# Patient Record
Sex: FEMALE | Race: BLACK OR AFRICAN AMERICAN
[De-identification: names, ages, dates, MRNs, and addresses within clinical notes are randomized per-mention and may not be internally consistent; named-entity substitution may affect disease eponyms.]

---

## 2018-10-31 ENCOUNTER — HOSPITAL ENCOUNTER (INPATIENT)
Dept: HOSPITAL 74 - JER | Age: 68
LOS: 7 days | Discharge: NURSING, REHAB OR LONG-TERM CARE | DRG: 871 | End: 2018-11-07
Attending: INTERNAL MEDICINE | Admitting: INTERNAL MEDICINE
Payer: COMMERCIAL

## 2018-10-31 VITALS — BODY MASS INDEX: 53.5 KG/M2

## 2018-10-31 DIAGNOSIS — A40.1: Primary | ICD-10-CM

## 2018-10-31 DIAGNOSIS — R07.89: ICD-10-CM

## 2018-10-31 DIAGNOSIS — L03.116: ICD-10-CM

## 2018-10-31 DIAGNOSIS — G89.4: ICD-10-CM

## 2018-10-31 DIAGNOSIS — R60.0: ICD-10-CM

## 2018-10-31 DIAGNOSIS — Z89.422: ICD-10-CM

## 2018-10-31 DIAGNOSIS — B96.89: ICD-10-CM

## 2018-10-31 DIAGNOSIS — I83.018: ICD-10-CM

## 2018-10-31 DIAGNOSIS — N39.0: ICD-10-CM

## 2018-10-31 DIAGNOSIS — L97.828: ICD-10-CM

## 2018-10-31 DIAGNOSIS — I21.4: ICD-10-CM

## 2018-10-31 DIAGNOSIS — E11.51: ICD-10-CM

## 2018-10-31 DIAGNOSIS — I12.9: ICD-10-CM

## 2018-10-31 DIAGNOSIS — L97.428: ICD-10-CM

## 2018-10-31 DIAGNOSIS — E78.5: ICD-10-CM

## 2018-10-31 DIAGNOSIS — L03.115: ICD-10-CM

## 2018-10-31 DIAGNOSIS — E11.22: ICD-10-CM

## 2018-10-31 DIAGNOSIS — Z79.4: ICD-10-CM

## 2018-10-31 DIAGNOSIS — L97.818: ICD-10-CM

## 2018-10-31 DIAGNOSIS — E11.622: ICD-10-CM

## 2018-10-31 DIAGNOSIS — J44.9: ICD-10-CM

## 2018-10-31 DIAGNOSIS — I83.028: ICD-10-CM

## 2018-10-31 DIAGNOSIS — N18.3: ICD-10-CM

## 2018-10-31 DIAGNOSIS — D64.9: ICD-10-CM

## 2018-10-31 DIAGNOSIS — E66.01: ICD-10-CM

## 2018-10-31 LAB
ALBUMIN SERPL-MCNC: 3.1 G/DL (ref 3.4–5)
ALP SERPL-CCNC: 56 U/L (ref 45–117)
ALT SERPL-CCNC: 15 U/L (ref 13–61)
ANION GAP SERPL CALC-SCNC: 9 MMOL/L (ref 8–16)
ANISOCYTOSIS BLD QL: 0
APPEARANCE UR: (no result)
APTT BLD: 32.6 SECONDS (ref 25.2–36.5)
AST SERPL-CCNC: 10 U/L (ref 15–37)
BACTERIA #/AREA URNS HPF: (no result) /HPF
BASOPHILS # BLD: 1 % (ref 0–2)
BILIRUB SERPL-MCNC: 0.3 MG/DL (ref 0.2–1)
BILIRUB UR STRIP.AUTO-MCNC: NEGATIVE MG/DL
BUN SERPL-MCNC: 30 MG/DL (ref 7–18)
CALCIUM SERPL-MCNC: 8.8 MG/DL (ref 8.5–10.1)
CHLORIDE SERPL-SCNC: 106 MMOL/L (ref 98–107)
CO2 SERPL-SCNC: 24 MMOL/L (ref 21–32)
COLOR UR: (no result)
CREAT SERPL-MCNC: 1.1 MG/DL (ref 0.55–1.3)
DEPRECATED RDW RBC AUTO: 16.4 % (ref 11.6–15.6)
EOSINOPHIL # BLD: 0.1 % (ref 0–4.5)
GLUCOSE SERPL-MCNC: 143 MG/DL (ref 74–106)
HCT VFR BLD CALC: 26.8 % (ref 32.4–45.2)
HGB BLD-MCNC: 8.3 GM/DL (ref 10.7–15.3)
INR BLD: 1.14 (ref 0.83–1.09)
KETONES UR QL STRIP: NEGATIVE
LEUKOCYTE ESTERASE UR QL STRIP.AUTO: (no result)
LYMPHOCYTES # BLD: 1.5 % (ref 8–40)
MACROCYTES BLD QL: 0
MCH RBC QN AUTO: 24.7 PG (ref 25.7–33.7)
MCHC RBC AUTO-ENTMCNC: 30.9 G/DL (ref 32–36)
MCV RBC: 79.8 FL (ref 80–96)
MONOCYTES # BLD AUTO: 3.2 % (ref 3.8–10.2)
MUCOUS THREADS URNS QL MICRO: (no result)
NEUTROPHILS # BLD: 94.2 % (ref 42.8–82.8)
NITRITE UR QL STRIP: NEGATIVE
PH BLDV: 7.36 [PH] (ref 7.32–7.42)
PH UR: 5 [PH] (ref 5–8)
PLATELET # BLD AUTO: 379 K/MM3 (ref 134–434)
PLATELET BLD QL SMEAR: NORMAL
PMV BLD: 7.2 FL (ref 7.5–11.1)
POTASSIUM SERPLBLD-SCNC: 4.7 MMOL/L (ref 3.5–5.1)
PROT SERPL-MCNC: 8.5 G/DL (ref 6.4–8.2)
PROT UR QL STRIP: (no result)
PROT UR QL STRIP: NEGATIVE
PT PNL PPP: 13.5 SEC (ref 9.7–13)
RBC # BLD AUTO: 3.36 M/MM3 (ref 3.6–5.2)
SODIUM SERPL-SCNC: 139 MMOL/L (ref 136–145)
SP GR UR: 1.01 (ref 1.01–1.03)
UROBILINOGEN UR STRIP-MCNC: NEGATIVE MG/DL (ref 0.2–1)
VENOUS PC02: 42.9 MMHG (ref 38–52)
VENOUS PO2: 23.3 MMHG (ref 28–48)
WBC # BLD AUTO: 19.6 K/MM3 (ref 4–10)

## 2018-10-31 PROCEDURE — P9058 RBC, L/R, CMV-NEG, IRRAD: HCPCS

## 2018-10-31 PROCEDURE — C1751 CATH, INF, PER/CENT/MIDLINE: HCPCS

## 2018-10-31 PROCEDURE — G0008 ADMIN INFLUENZA VIRUS VAC: HCPCS

## 2018-10-31 PROCEDURE — P9038 RBC IRRADIATED: HCPCS

## 2018-10-31 PROCEDURE — G0277 HBOT, FULL BODY CHAMBER, 30M: HCPCS

## 2018-10-31 NOTE — PDOC
History of Present Illness





- General


History Source: Patient


Exam Limitations: No Limitations





- History of Present Illness


Initial Comments: 





10/31/18 14:35


The patient is a 68 year old female, with a significant PMH of diabetes, asthma

, hypertension who presents to the emergency department sent in for fever. 

Patient states she was going to her hyperbaric treatment but was unable to have 

treatment secondary to her fever. Patient had her dressing changed yesterday. 

Patient admits to warmth to her left leg. 





The patient denies chest pain, shortness of breath, headache and dizziness.


Denies chills, nausea, vomit, diarrhea and constipation.


Denies dysuria, frequency, urgency and hematuria.





Allergies: NKA


Past surgical history: tubal ligation


Social history: No reported alcohol, drug, or cigarette use. 


PCP: Dr. Aleman 


 


 





<Ana Harrington - Last Filed: 10/31/18 15:06>





- General


History Source: Patient


Exam Limitations: No Limitations





<Elizabeth Lozada - Last Filed: 11/01/18 10:43>





- General


Chief Complaint: Respiratory


Stated Complaint: FEVER


Time Seen by Provider: 10/31/18 13:52





Past History





<Ana Harrington - Last Filed: 10/31/18 15:06>





- Past Medical History


Asthma: Yes


Cancer: No


Cardiac Disorders: No


CVA: No


COPD: No


CHF: No


Dementia: No


Diabetes: Yes


GI Disorders: No


 Disorders: No


HTN: No


Hypercholesterolemia: No


Liver Disease: No


Seizures: No


Thyroid Disease: No





- Surgical History


Abdominal Surgery: No


Appendectomy: No


Cardiac Surgery: No


Cholecystectomy: No


Lung Surgery: No


Neurologic Surgery: No


Orthopedic Surgery: No





- Suicide/Smoking/Psychosocial Hx


Smoking History: Never smoked


Have you smoked in the past 12 months: No





<Elizabeth Lozada - Last Filed: 11/01/18 10:43>





- Past Medical History


Allergies/Adverse Reactions: 


 Allergies











Allergy/AdvReac Type Severity Reaction Status Date / Time


 


No Known Allergies Allergy   Verified 10/31/18 14:17











Home Medications: 


Ambulatory Orders





Aspirin [Baby Aspirin] 81 mg PO DAILY 05/17/12 


Furosemide [Lasix] 20 mg PO DAILY 05/17/12 


Montelukast Na [Singulair] 10 mg PO HS 05/17/12 


Gabapentin 1 cap PO TID 02/11/14 


Amlodipine Besylate [Norvasc -] 5 mg PO BID 03/31/14 


Ascorbic Acid [Vitamin C] 250 mg PO DAILY 03/31/14 


Cholecalciferol (Vitamin D3) [Vitamin D] 1,000 unit PO DAILY 03/31/14 


Docosahexanoic Acid/Epa [Fish Oil Softgel] 1 each PO DAILY 03/31/14 


Glyburide 5 mg PO BID 03/31/14 


Ibuprofen [Advil -] 400 mg PO QID PRN 03/31/14 


Insulin Glargine,Hum.rec.anlog [Lantus Solostar PEN -] 30 units SCJ DAILY 03/31/ 14 


Losartan/Hydrochlorothiazide [Losartan-Hctz 100-25 mg Tab] 1 tab PO DAILY 03/31/ 14 


Meloxicam [Mobic -] 15 mg PO DAILY 03/31/14 


Tramadol HCl 50 mg PO QID PRN 03/31/14 


Vitamin B Complex 1 each PO DAILY 03/31/14 


oxyCODONE HCL [Roxicodone -] 5 mg PO Q6H PRN 03/31/14 


Clonidine 0.1 mg PO HS 07/15/14 


Cyclobenzaprine HCl 10 mg PO HS 05/12/15 











**Review of Systems





- Review of Systems


Able to Perform ROS?: Yes


Comments:: 





10/31/18 15:07


ADULT ROS


GENERAL/CONSTITUTIONAL: (+) Fever. (+) Chills. No weakness.


HEAD, EYES, EARS, NOSE AND THROAT: No change in vision. No ear pain or 

discharge. No sore throat.


CARDIOVASCULAR: No chest pain or shortness of breath.


RESPIRATORY: No cough, wheezing, or hemoptysis.


GASTROINTESTINAL: No nausea, vomiting, diarrhea or constipation.


GENITOURINARY: No dysuria, frequency, or change in urination.


MUSCULOSKELETAL: No joint or muscle swelling or pain. No neck or back pain.


SKIN: No rash


NEUROLOGIC: No headache, vertigo, loss of consciousness, or change in strength/

sensation.


ENDOCRINE: No increased thirst. No abnormal weight change.


HEMATOLOGIC/LYMPHATIC: No anemia, easy bleeding, or history of blood clots.


ALLERGIC/IMMUNOLOGIC: No hives or skin allergy.








<Ana Harrington - Last Filed: 10/31/18 15:06>





*Physical Exam





- Vital Signs


 Last Vital Signs











Temp Pulse Resp BP Pulse Ox


 


 102.9 F H  111 H  18   161/85   100 


 


 10/31/18 13:32  10/31/18 13:32  10/31/18 13:32  10/31/18 13:32  10/31/18 13:32














- Physical Exam


Comments: 





10/31/18 15:08


ADULT PE


GENERAL: The patient is in no acute distress. (+) Morbidly obese. 


HEAD: Normal with no signs of trauma.


EYES: PERRLA, EOMI, sclera anicteric, conjunctiva clear.


ENT: Ears normal, nares patent, oropharynx clear without exudates.


Moist mucous membranes.


NECK: Normal range of motion, supple without lymphadenopathy, JVD, or masses.


LUNGS: Breath sounds equal, clear to auscultation bilaterally. No


wheezes, and no crackles.


HEART: (+) Tachycardic but regular rhythm, normal S1 and S2, no rub or gallop. (

+) systolic murmur. 


ABDOMEN: Soft, nontender, normoactive bowel sounds. No guarding, no


rebound. No masses palpable.


EXTREMITIES: Normal range of motion, no edema. No clubbing or


cyanosis. No erythema, or tenderness.


NEUROLOGICAL: Cranial nerves II through XII grossly intact. Normal


speech. No focal neurological deficits.


MUSCULOSKELETAL: Back non-tender to palpation, no CVA tenderness


SKIN: Warm, Dry, normal turgor, no rashes or lesions noted.








<Ana Harrington - Last Filed: 10/31/18 15:06>





ED Treatment Course





- LABORATORY


CBC & Chemistry Diagram: 


 10/31/18 14:24





 10/31/18 14:24





<Ana Harrington - Last Filed: 10/31/18 15:06>





- LABORATORY


CBC & Chemistry Diagram: 


 11/01/18 06:15





 11/01/18 06:15





<Elizabeth Lozada - Last Filed: 11/01/18 10:43>





Medical Decision Making





- Critical Care Time


Total Critical Care Time (minutes): 60


Critical Care Statement: The care of this patient involved high complexity 

decision making to prevent further life threatening deterioration of the patient

's condition and/or to evaluate & treat vital organ system(s) failure or risk 

of failure.





- Medical Decision Making





Ms Alvarez presents from [facility] due to fevers


Pt has a h/o DM, HTN, HLD, chronic lower extremity wounds, recurrent UTI


She tells me that she began feeling unwell yesterday


She noted left ear pain


No chest pain, no cough, no shortness of breath


No abdominal tenderness


No flank pain


wounds dressed by son








On exam:


Pt is febrile


Awake and alert


Answers questions appropriately


Tachycardiac


CTA (limited examination)


mild suprapubic tenderness to palpation


Lower extremity wounds noted, (+) drainage, malodorous, (+) warmth, erythema





10/31/18 15:49


Labs ordered


Vanc and Zosyn ordered


IVF ordered


Tylenol ordered


Xray chest and legs ordered


 Laboratory Tests











  06/27/18 10/31/18 10/31/18





  17:01 14:24 14:24


 


WBC  6.8  19.6 H 


 


Hgb  9.0 L  8.3 L 


 


Hct  28.4 L  26.8 L 


 


Plt Count  353  379 


 


INR    1.14 H


 


VBG pH   


 


POC VBG pCO2   


 


POC VBG pO2   


 


Mixed VBG HCO3   


 


BUN   


 


Creatinine   


 


Random Glucose   


 


Lactic Acid   


 


Creatine Kinase   


 


CK-MB (CK-2)   


 


Urine Blood   


 


Urine Nitrite   


 


Ur Leukocyte Esterase   


 


Urine WBC (Auto)   


 


Urine RBC (Auto)   














  10/31/18 10/31/18 10/31/18





  14:24 14:24 14:24


 


WBC   


 


Hgb   


 


Hct   


 


Plt Count   


 


INR   


 


VBG pH  7.36  


 


POC VBG pCO2  42.9  


 


POC VBG pO2  23.3 L  


 


Mixed VBG HCO3  23.7  


 


BUN   30 H 


 


Creatinine   1.1 


 


Random Glucose   143 H 


 


Lactic Acid    0.8


 


Creatine Kinase   68 


 


CK-MB (CK-2)   < 1.0 


 


Urine Blood   


 


Urine Nitrite   


 


Ur Leukocyte Esterase   


 


Urine WBC (Auto)   


 


Urine RBC (Auto)   














  10/31/18





  14:50


 


WBC 


 


Hgb 


 


Hct 


 


Plt Count 


 


INR 


 


VBG pH 


 


POC VBG pCO2 


 


POC VBG pO2 


 


Mixed VBG HCO3 


 


BUN 


 


Creatinine 


 


Random Glucose 


 


Lactic Acid 


 


Creatine Kinase 


 


CK-MB (CK-2) 


 


Urine Blood  2+ H


 


Urine Nitrite  Negative


 


Ur Leukocyte Esterase  2+ H


 


Urine WBC (Auto)  79


 


Urine RBC (Auto)  30














Twelve-lead EKG was performed and reviewed by me. There is normal sinus rhythm 

witha tachycardiac rate of 112 bpm.  The axis is normal. The intervals are 

normal - pr:162ms, QRS:80ms, QTc:442ms. There are no ST or T wave abnormalities.





Sinus tachycardia








SIRS


Lactate nml


No hypotension


Source possibly UTI vs cellulitis








<Elizabeth Lozada - Last Filed: 11/01/18 10:43>





*DC/Admit/Observation/Transfer





- Attestations


Scribe Attestion: 





10/31/18 15:09





Documentation prepared by Ana Harrington, acting as medical scribe for Elizabeth Lozada MD.





<Ana Harrington - Last Filed: 10/31/18 15:06>





- Discharge Dispostion


Decision to Admit order: Yes





<Elizabeth Lozada - Last Filed: 11/01/18 10:43>


Diagnosis at time of Disposition: 


Cellulitis


Qualifiers:


 Site of cellulitis: extremity Site of cellulitis of extremity: lower extremity 

Laterality: left Qualified Code(s): L03.116 - Cellulitis of left lower limb








- Discharge Dispostion


Condition at time of disposition: Stable

## 2018-10-31 NOTE — HP
Admitting History and Physical





- Primary Care Physician


PCP: Rogerio Llamas





- Admission


Chief Complaint: I was shaking like a leaf


History of Present Illness: 


Mrs Alvarez is a pleasant 68 year old female who was sent in from hyperbaric 

therapy with fevers. She states that she has chronic swelling of both her legs 

with chronic wounds. She is followed by Dr Prater for this. She was in her 

normal state of health until last night. She says she began to feel tired and 

went to lie down, however at this time she became hot and diaphoretic. This 

continued through the night and into the morning. She went to hyperbaric and 

there she says she began to shake significantly. Her temperature was taken and 

she was found to be febrile, she was immediately sent to the ED for evaluation 

and admission. Currently she says she is feeling better after receiving IV 

antibiotics. She says that she was lightheaded once yesterday in the morning 

when she got out of bed, she attributed this to standing up too fast. She did 

not pass out and it has not recurred. She says she has off and on chest 

pressure that does not radiate, she does not know how long this has been going 

on and cannot tell me more about it. She notes shortness of breath with exertion

, again she is vague in her description but it sounds like she has this 

chronically and it might be worse over the past 2-3 days. She says she has 

chronic leg swelling and wounds and she does not know if it is getting better 

or not. She also complains of slight burning on urination. She denies coughing, 

nausea, vomiting, diarrhea, weakness, or malaise.


History Source: Patient


Limitations to Obtaining History: No Limitations





- Past Medical History


Cardiovascular: Yes: HTN, Hyperlipdemia


Endocrine: Yes: Diabetes Mellitus





- Past Surgical History


Past Surgical History: Yes: Amputation (L toe)





- Smoking History


Smoking history: Never smoked


Have you smoked in the past 12 months: No





- Alcohol/Substance Use


Hx Alcohol Use: No


History of Substance Use: reports: None





- Social History


Usual Living Arrangement: Yes: With Child


ADL: Family Assistance


History of Recent Travel: No





Home Medications





- Allergies


Allergies/Adverse Reactions: 


 Allergies











Allergy/AdvReac Type Severity Reaction Status Date / Time


 


No Known Allergies Allergy   Verified 10/31/18 14:17














- Home Medications


Home Medications: 


Ambulatory Orders





Aspirin [Baby Aspirin] 81 mg PO DAILY 05/17/12 


Furosemide [Lasix] 20 mg PO DAILY 05/17/12 


Montelukast Na [Singulair] 10 mg PO HS 05/17/12 


Gabapentin 1 cap PO TID 02/11/14 


Amlodipine Besylate [Norvasc -] 5 mg PO BID 03/31/14 


Ascorbic Acid [Vitamin C] 250 mg PO DAILY 03/31/14 


Cholecalciferol (Vitamin D3) [Vitamin D] 1,000 unit PO DAILY 03/31/14 


Docosahexanoic Acid/Epa [Fish Oil Softgel] 1 each PO DAILY 03/31/14 


Glyburide 5 mg PO BID 03/31/14 


Ibuprofen [Advil -] 400 mg PO QID PRN 03/31/14 


Insulin Glargine,Hum.rec.anlog [Lantus Solostar PEN -] 30 units SCJ DAILY 03/31/ 14 


Losartan/Hydrochlorothiazide [Losartan-Hctz 100-25 mg Tab] 1 tab PO DAILY 03/31/ 14 


Meloxicam [Mobic -] 15 mg PO DAILY 03/31/14 


Tramadol HCl 50 mg PO QID PRN 03/31/14 


Vitamin B Complex 1 each PO DAILY 03/31/14 


oxyCODONE HCL [Roxicodone -] 5 mg PO Q6H PRN 03/31/14 


Clonidine 0.1 mg PO HS 07/15/14 


Cyclobenzaprine HCl 10 mg PO HS 05/12/15 











Family Disease History





- Family Disease History


Family Disease History: CA: Father (prostate), Other: Mother (CVA), Brother (

thyroid nodule)





Review of Systems


Findings/Remarks: 


Full review of systems obtained, as per HPI and otherwise negative





Physical Examination


Vital Signs: 


 Vital Signs











Temperature  39.4 C H  10/31/18 13:32


 


Pulse Rate  111 H  10/31/18 13:32


 


Respiratory Rate  18   10/31/18 13:32


 


Blood Pressure  161/85   10/31/18 13:32


 


O2 Sat by Pulse Oximetry (%)  96   10/31/18 16:24











Constitutional: Yes: No Distress, Calm, Obese (morbid), Poor Hygeine


Eyes: Yes: Conjunctiva Clear, EOM Intact, PERRL


HENT: Yes: Atraumatic, Normocephalic


Cardiovascular: Yes: Regular Rate and Rhythm.  No: Gallop, Murmur, Rub


Respiratory: Yes: Regular, CTA Bilaterally.  No: Rales, Rhonchi, Wheezes


Gastrointestinal: Yes: Normal Bowel Sounds, Soft.  No: Distention, Tenderness


Extremities: Yes: Other (ulceration, maladorous)


Edema: LLE: 3+, RLE: 3+


Labs: 


 CBC, BMP





 10/31/18 14:24 





 10/31/18 14:24 











Imaging





- Results


Chest X-ray: Report Reviewed, Image Reviewed


X-ray: Image Reviewed (? calcification, awaiting official read. Call placed to 

radiology)





Problem List





- Problems


(1) Sepsis


Assessment/Plan: 


-possible UTI, but most likely from bilateral wounds on legs


-ID consult, place on vancomycin and zosyn


-hydration with IVF


-prn tylenol for fevers


-follow up cultures (wound, blood, urine)


-monitor on telemetry


Code(s): A41.9 - SEPSIS, UNSPECIFIED ORGANISM   





(2) Cellulitis


Assessment/Plan: 


-with infected chronic wounds


-consult wound care


-x-ray reviewed, awaiting official read


-continue vancomycin and zosyn


Code(s): L03.90 - CELLULITIS, UNSPECIFIED   


Qualifiers: 


   Site of cellulitis: extremity   Site of cellulitis of extremity: lower 

extremity   Laterality: left   Qualified Code(s): L03.116 - Cellulitis of left 

lower limb   





(3) Foot ulcer, left


Assessment/Plan: 


-as above


Code(s): L97.529 - NON-PRESSURE CHRONIC ULCER OTH PRT LEFT FOOT W UNSP SEVERITY

   





(4) Diabetes


Assessment/Plan: 


-diabetic diet


-levemir 15 units bid (on lantus 30 units at home)


-FSBS and SSI


Code(s): E11.9 - TYPE 2 DIABETES MELLITUS WITHOUT COMPLICATIONS   


Qualifiers: 


   Diabetes mellitus type: type 2   Diabetes mellitus long term insulin use: 

with long term use   Diabetes mellitus complication status: with skin 

complications   Diabetes mellitus complication detail: with other skin ulcer   

Qualified Code(s): E11.622 - Type 2 diabetes mellitus with other skin ulcer; 

Z79.4 - Long term (current) use of insulin   





(5) HTN (hypertension)


Assessment/Plan: 


-continue amlodipine and losartan HCT


-monitor for hypotension


Code(s): I10 - ESSENTIAL (PRIMARY) HYPERTENSION   





(6) Chest pressure


Assessment/Plan: 


-suspect atypical chest pain


-first set of troponins negative


-cardiac enzymes q8h x3


-monitor on telemetry


-cardiology consult


Code(s): R07.89 - OTHER CHEST PAIN   





(7) Leg edema


Assessment/Plan: 


-chronic


-appears to be lymphedema, however on lasix


-will check ECHO for possible heart failure component


-holding lasix currently as hydrating


-if secondary to CHF, will need to closely monitor IVF


Code(s): R60.0 - LOCALIZED EDEMA   





(8) Morbid obesity


Assessment/Plan: 


-outpatient regimen for weight loss


Code(s): E66.01 - MORBID (SEVERE) OBESITY DUE TO EXCESS CALORIES   





(9) Chronic pain


Assessment/Plan: 


-continue home regimen


Code(s): G89.29 - OTHER CHRONIC PAIN   


Qualifiers: 


   Chronic pain type: chronic pain syndrome   Qualified Code(s): G89.4 - 

Chronic pain syndrome

## 2018-10-31 NOTE — EKG
Test Reason : 

Blood Pressure : ***/*** mmHG

Vent. Rate : 112 BPM     Atrial Rate : 112 BPM

   P-R Int : 162 ms          QRS Dur : 080 ms

    QT Int : 324 ms       P-R-T Axes : 066 -03 066 degrees

   QTc Int : 442 ms

 

*** POOR DATA QUALITY, INTERPRETATION MAY BE ADVERSELY AFFECTED

SINUS TACHYCARDIA

SEPTAL INFARCT , AGE UNDETERMINED

ABNORMAL ECG

NO PREVIOUS ECGS AVAILABLE

Confirmed by BAM CANNON, LEX ([id number]) on 10/31/2018 3:37:47 PM

 

Referred By:             Confirmed By:LEX KUHN MD

## 2018-11-01 LAB
ANION GAP SERPL CALC-SCNC: 8 MMOL/L (ref 8–16)
BUN SERPL-MCNC: 34 MG/DL (ref 7–18)
CALCIUM SERPL-MCNC: 8.4 MG/DL (ref 8.5–10.1)
CHLORIDE SERPL-SCNC: 106 MMOL/L (ref 98–107)
CO2 SERPL-SCNC: 25 MMOL/L (ref 21–32)
CREAT SERPL-MCNC: 1.4 MG/DL (ref 0.55–1.3)
DEPRECATED RDW RBC AUTO: 16.4 % (ref 11.6–15.6)
GLUCOSE SERPL-MCNC: 95 MG/DL (ref 74–106)
HCT VFR BLD CALC: 24.1 % (ref 32.4–45.2)
HGB BLD-MCNC: 7.4 GM/DL (ref 10.7–15.3)
MAGNESIUM SERPL-MCNC: 2 MG/DL (ref 1.8–2.4)
MCH RBC QN AUTO: 24.7 PG (ref 25.7–33.7)
MCHC RBC AUTO-ENTMCNC: 30.8 G/DL (ref 32–36)
MCV RBC: 80 FL (ref 80–96)
PHOSPHATE SERPL-MCNC: 3 MG/DL (ref 2.5–4.9)
PLATELET # BLD AUTO: 313 K/MM3 (ref 134–434)
PMV BLD: 7.5 FL (ref 7.5–11.1)
POTASSIUM SERPLBLD-SCNC: 4.5 MMOL/L (ref 3.5–5.1)
RBC # BLD AUTO: 3.01 M/MM3 (ref 3.6–5.2)
SODIUM SERPL-SCNC: 139 MMOL/L (ref 136–145)
WBC # BLD AUTO: 20.1 K/MM3 (ref 4–10)

## 2018-11-01 PROCEDURE — 30233N1 TRANSFUSION OF NONAUTOLOGOUS RED BLOOD CELLS INTO PERIPHERAL VEIN, PERCUTANEOUS APPROACH: ICD-10-PCS | Performed by: INTERNAL MEDICINE

## 2018-11-01 RX ADMIN — ACETAMINOPHEN PRN MG: 325 TABLET ORAL at 09:04

## 2018-11-01 RX ADMIN — PIPERACILLIN AND TAZOBACTAM SCH: 4; .5 INJECTION, POWDER, LYOPHILIZED, FOR SOLUTION INTRAVENOUS at 18:57

## 2018-11-01 RX ADMIN — INSULIN ASPART SCH UNIT: 100 INJECTION, SOLUTION INTRAVENOUS; SUBCUTANEOUS at 01:34

## 2018-11-01 RX ADMIN — PIPERACILLIN AND TAZOBACTAM SCH: 4; .5 INJECTION, POWDER, LYOPHILIZED, FOR SOLUTION INTRAVENOUS at 11:37

## 2018-11-01 RX ADMIN — PIPERACILLIN AND TAZOBACTAM SCH MLS/HR: 4; .5 INJECTION, POWDER, LYOPHILIZED, FOR SOLUTION INTRAVENOUS at 14:15

## 2018-11-01 RX ADMIN — MONTELUKAST SODIUM SCH MG: 10 TABLET, COATED ORAL at 00:34

## 2018-11-01 RX ADMIN — Medication SCH MG: at 10:21

## 2018-11-01 RX ADMIN — VITAMIN C SCH EACH: TAB at 10:21

## 2018-11-01 RX ADMIN — ASPIRIN 81 MG SCH MG: 81 TABLET ORAL at 10:21

## 2018-11-01 RX ADMIN — GABAPENTIN SCH MG: 100 CAPSULE ORAL at 15:26

## 2018-11-01 RX ADMIN — VANCOMYCIN HYDROCHLORIDE SCH MLS/HR: 1 INJECTION, POWDER, LYOPHILIZED, FOR SOLUTION INTRAVENOUS at 10:44

## 2018-11-01 RX ADMIN — GABAPENTIN SCH: 100 CAPSULE ORAL at 15:24

## 2018-11-01 RX ADMIN — AMLODIPINE BESYLATE SCH MG: 5 TABLET ORAL at 00:34

## 2018-11-01 RX ADMIN — LOSARTAN POTASSIUM AND HYDROCHLOROTHIAZIDE TABLETS SCH TAB: 50; 12.5 TABLET, FILM COATED ORAL at 10:21

## 2018-11-01 RX ADMIN — VITAMIN D, TAB 1000IU (100/BT) SCH UNIT: 25 TAB at 11:23

## 2018-11-01 RX ADMIN — ACETAMINOPHEN PRN MG: 325 TABLET ORAL at 00:35

## 2018-11-01 RX ADMIN — INSULIN ASPART SCH UNIT: 100 INJECTION, SOLUTION INTRAVENOUS; SUBCUTANEOUS at 15:23

## 2018-11-01 RX ADMIN — HEPARIN SODIUM PRN UNIT: 5000 INJECTION, SOLUTION INTRAVENOUS; SUBCUTANEOUS at 18:56

## 2018-11-01 RX ADMIN — AMLODIPINE BESYLATE SCH MG: 5 TABLET ORAL at 10:21

## 2018-11-01 RX ADMIN — INSULIN DETEMIR SCH UNIT: 100 INJECTION, SOLUTION SUBCUTANEOUS at 00:33

## 2018-11-01 RX ADMIN — SODIUM CHLORIDE SCH MLS/HR: 9 INJECTION, SOLUTION INTRAVENOUS at 12:44

## 2018-11-01 RX ADMIN — GABAPENTIN SCH MG: 100 CAPSULE ORAL at 00:33

## 2018-11-01 RX ADMIN — HEPARIN SODIUM PRN UNIT: 5000 INJECTION, SOLUTION INTRAVENOUS; SUBCUTANEOUS at 12:44

## 2018-11-01 RX ADMIN — GLYBURIDE SCH: 5 TABLET ORAL at 12:07

## 2018-11-01 RX ADMIN — PIPERACILLIN AND TAZOBACTAM SCH MLS/HR: 4; .5 INJECTION, POWDER, LYOPHILIZED, FOR SOLUTION INTRAVENOUS at 01:34

## 2018-11-01 RX ADMIN — GLYBURIDE SCH: 5 TABLET ORAL at 17:24

## 2018-11-01 RX ADMIN — INSULIN ASPART SCH UNIT: 100 INJECTION, SOLUTION INTRAVENOUS; SUBCUTANEOUS at 17:36

## 2018-11-01 NOTE — PN
Progress Note, Physician


Chief Complaint: 


Ms Alvarez says her legs are hurting and she did not sleep well last night. 

Denies cp, sob, n/v.





- Current Medication List


Current Medications: 


Active Medications





Acetaminophen (Tylenol -)  650 mg PO Q6H PRN


   PRN Reason: FEVER


   Last Admin: 11/01/18 09:04 Dose:  650 mg


Amlodipine Besylate (Norvasc -)  5 mg PO BID [facility]


   Last Admin: 11/01/18 10:21 Dose:  5 mg


Ascorbic Acid (Vitamin C -)  250 mg PO DAILY [facility]


   Last Admin: 11/01/18 10:21 Dose:  250 mg


Aspirin (Asa -)  81 mg PO DAILY [facility]


   Last Admin: 11/01/18 10:21 Dose:  81 mg


Atorvastatin Calcium (Lipitor -)  40 mg PO HS [facility]


Cholecalciferol (Vitamin D3 -)  1,000 unit PO DAILY [facility]


   Last Admin: 11/01/18 11:23 Dose:  1,000 unit


Clonidine (Catapres -)  0.1 mg PO HS [facility]


   Last Admin: 11/01/18 00:32 Dose:  0.1 mg


Cyclobenzaprine HCl (Flexeril -)  10 mg PO HS [facility]


Gabapentin (Neurontin -)  100 mg PO TID [facility]


   Last Admin: 11/01/18 00:33 Dose:  100 mg


Glyburide (Diabeta -)  5 mg PO BIDAC [facility]


   Last Admin: 11/01/18 12:07 Dose:  Not Given


HCTZ/Losartan Potassium (Hyzaar -)  2 tab PO DAILY [facility]


   Last Admin: 11/01/18 10:21 Dose:  2 tab


Heparin Sodium (Porcine) (Heparin -)  1,000 unit IVPUSH PRN PRN


   PRN Reason: Heparin


Heparin Sodium (Porcine) (Heparin -)  5,000 unit IVPUSH PRN PRN


   PRN Reason: Heparin


   Last Admin: 11/01/18 12:44 Dose:  5,000 unit


Vancomycin HCl 1,500 mg/ (Dextrose)  500 mls @ 166.667 mls/hr IVPB Q12H [facility]; 

Protocol


   Last Admin: 11/01/18 10:44 Dose:  166.667 mls/hr


Piperacillin Sod/Tazobactam (Sod 4.5 gm/ Dextrose)  100 mls @ 200 mls/hr IVPB 

Q8H-IV [facility]; Protocol


   Last Admin: 11/01/18 14:15 Dose:  200 mls/hr


Heparin Sodium (Porcine) 25, (000 unit/ Sodium Chloride)  500 mls @ 20 mls/hr 

IV TITR [facility]; Protocol


   Last Admin: 11/01/18 12:44 Dose:  1,000 unit/hr, 20 mls/hr


Insulin Aspart (Novolog Vial Sliding Scale -)  1 vial SQ ACHS [facility]; Protocol


   Last Admin: 11/01/18 01:34 Dose:  2 unit


Insulin Detemir (Levemir Vial)  15 units SQ 0700,2200 [facility]


   Last Admin: 11/01/18 00:33 Dose:  15 unit


Montelukast Sodium (Singulair -)  10 mg PO HS [facility]


   Last Admin: 11/01/18 00:34 Dose:  10 mg


Multivitamins (Total B With C -)  1 each PO DAILY [facility]


   Last Admin: 11/01/18 10:21 Dose:  1 each


Non-Formulary Medication (Docosahexanoic Acid/Epa [Fish Oil Softgel])  1 each 

PO DAILY [facility]


Non-Formulary Medication (Meloxicam)  15 mg PO DAILY [facility]


Ondansetron HCl (Zofran Injection)  4 mg IVPUSH Q6H PRN


   PRN Reason: NAUSEA


Tramadol HCl (Ultram -)  50 mg PO Q6H PRN


   PRN Reason: PAIN 4-6











- Objective


Vital Signs: 


 Vital Signs











Temperature  37.7 C H  11/01/18 14:29


 


Pulse Rate  82   11/01/18 14:29


 


Respiratory Rate  18   11/01/18 14:29


 


Blood Pressure  155/51 L  11/01/18 14:29


 


O2 Sat by Pulse Oximetry (%)  96   11/01/18 14:29











Constitutional: Yes: No Distress, Calm, Obese


Cardiovascular: Yes: Regular Rate and Rhythm.  No: Gallop, Murmur, Rub


Respiratory: Yes: Regular, CTA Bilaterally.  No: Rales, Rhonchi, Wheezes


Gastrointestinal: Yes: Normal Bowel Sounds, Soft.  No: Distention, Tenderness


Extremities: Yes: WNL


Edema: No


Labs: 


 CBC, BMP





 11/01/18 06:15 





 11/01/18 06:15 





 INR, PTT











INR  1.14  (0.83-1.09)  H  10/31/18  14:24    














Problem List





- Problems


(1) Sepsis


Code(s): A41.9 - SEPSIS, UNSPECIFIED ORGANISM   





(2) Cellulitis


Code(s): L03.90 - CELLULITIS, UNSPECIFIED   


Qualifiers: 


   Site of cellulitis: extremity   Site of cellulitis of extremity: lower 

extremity   Laterality: left   Qualified Code(s): L03.116 - Cellulitis of left 

lower limb   





(3) Foot ulcer, left


Code(s): L97.529 - NON-PRESSURE CHRONIC ULCER OTH PRT LEFT FOOT W UNSP SEVERITY

   





(4) Diabetes


Code(s): E11.9 - TYPE 2 DIABETES MELLITUS WITHOUT COMPLICATIONS   


Qualifiers: 


   Diabetes mellitus type: type 2   Diabetes mellitus long term insulin use: 

with long term use   Diabetes mellitus complication status: with skin 

complications   Diabetes mellitus complication detail: with other skin ulcer   

Qualified Code(s): E11.622 - Type 2 diabetes mellitus with other skin ulcer; 

Z79.4 - Long term (current) use of insulin   





(5) HTN (hypertension)


Code(s): I10 - ESSENTIAL (PRIMARY) HYPERTENSION   





(6) Leg edema


Code(s): R60.0 - LOCALIZED EDEMA   





(7) Morbid obesity


Code(s): E66.01 - MORBID (SEVERE) OBESITY DUE TO EXCESS CALORIES   





(8) Chronic pain


Code(s): G89.29 - OTHER CHRONIC PAIN   


Qualifiers: 


   Chronic pain type: chronic pain syndrome   Qualified Code(s): G89.4 - 

Chronic pain syndrome   





(9) NSTEMI (non-ST elevated myocardial infarction)


Code(s): I21.4 - NON-ST ELEVATION (NSTEMI) MYOCARDIAL INFARCTION   





Assessment/Plan





(1) Sepsis


Assessment/Plan: 


-most likely secondary to UTI


-with positive blood cultures


-case d/w ID


-continue vancomycin and zosyn


-continue IVF


Code(s): A41.9 - SEPSIS, UNSPECIFIED ORGANISM   





(2) Cellulitis


Assessment/Plan: 


-wound care consulted and awaiting recommendations


-continue vancomcyin secondary to h/o MRSA


-continue zosyn since patient is diabetic and needs pseudomonas coverage


Code(s): L03.90 - CELLULITIS, UNSPECIFIED   


Qualifiers: 


   Site of cellulitis: extremity   Site of cellulitis of extremity: lower 

extremity   Laterality: left   Qualified Code(s): L03.116 - Cellulitis of left 

lower limb   





(3) Foot ulcer, left


Assessment/Plan: 


-as above


Code(s): L97.529 - NON-PRESSURE CHRONIC ULCER OTH PRT LEFT FOOT W UNSP SEVERITY

   





(4) Diabetes


Assessment/Plan: 


-diabetic diet


-continue levemir 15 units bid


-continue FSBS and SSI


-may need adjustment, will monitor FSBS and determine insulin coverage needed


-may need increased coverage in the short term secondary to stress and 

antibiotics being in dextrose


Code(s): E11.9 - TYPE 2 DIABETES MELLITUS WITHOUT COMPLICATIONS   


Qualifiers: 


   Diabetes mellitus type: type 2   Diabetes mellitus long term insulin use: 

with long term use   Diabetes mellitus complication status: with skin 

complications   Diabetes mellitus complication detail: with other skin ulcer   

Qualified Code(s): E11.622 - Type 2 diabetes mellitus with other skin ulcer; 

Z79.4 - Long term (current) use of insulin   





(5) HTN (hypertension)


Assessment/Plan: 


-continue amlodipine and losartan HCT


-monitor


Code(s): I10 - ESSENTIAL (PRIMARY) HYPERTENSION   





(6) NSTEMI


Assessment/Plan: 


-troponin positive


-case d/w Dr Mercado


-will start on heparin gtt


-medical management


Code(s): R07.89 - OTHER CHEST PAIN   





(7) Leg edema


Assessment/Plan: 


-ECHO reviewed, no sign of CHF


-leg edema secondary to lymphedema


-holding lasix secondary to sepsis


Code(s): R60.0 - LOCALIZED EDEMA   





(8) Morbid obesity


Assessment/Plan: 


-outpatient regimen for weight loss


Code(s): E66.01 - MORBID (SEVERE) OBESITY DUE TO EXCESS CALORIES   





(9) Chronic pain


Assessment/Plan: 


-continue home regimen


Code(s): G89.29 - OTHER CHRONIC PAIN   


Qualifiers: 


   Chronic pain type: chronic pain syndrome   Qualified Code(s): G89.4 - 

Chronic pain syndrome   





(10) Anemia


-chronic, but suspect some aspect of dilution


-however with NSTEMI, will transfuse patient


-monitor

## 2018-11-01 NOTE — CONS
DATE OF CONSULTATION:  

 

DATE OF DICTATION:  11/01/2018 

 

HISTORY OF PRESENT ILLNESS:  The patient is a 68-year-old female evaluated for

positive blood cultures. 

 

The patient has a history of chronic stasis ulcers for which she is [facility].  She had been undergoing hyperbaric oxygen treatments.  While at

the hyperbaric facility, she developed shaking chills and was noted to have fever. 

She was transferred to the emergency room where blood cultures were obtained.  They

are now positive for gram-positive cocci in chains and clusters.  The patient was

empirically given vancomycin and Zosyn.   

 

The patient was chronic bilateral lower extremity stasis ulcers with malodorous

drainage.  She had also complained of some left ear pain.  She denies any dysuria or

hematuria.  She has no complaints of chest pain, shortness of breath, cough, sputum

production, vomiting or diarrhea.   

 

PAST MEDICAL HISTORY:  Positive for morbid obesity, diabetes mellitus, asthma,

hypertension. 

 

PAST SURGICAL HISTORY:  Status post tubal ligation. 

 

ALLERGIES:  No known allergies.

 

MEDICATIONS:  Clonidine, aspirin, Lasix, Singulair, Neurontin, Norvasc, Glyburide,

Advil, hydrochlorothiazide, tramadol.  

 

SOCIAL HISTORY:  She lives at home in the community.  She is a nonsmoker.  

 

REVIEW OF SYSTEMS:  

Neurologic:  No loss of consciousness, seizure activity or focal weakness.

Cardiac:  Negative for chest pain or palpitations. 

Respiratory:  Negative for cough or sputum production.

Gastrointestinal:  Negative for vomiting or diarrhea.

Genitourinary:  Negative for dysuria or hematuria.   

 

LABORATORY DATA:  White count 20,000 with left shift, hematocrit 24.1, platelet count

313, creatinine 1.4, lactic acid 0.8, liver enzymes within normal limits.  A

urinalysis shows 79 white cells.  Urine culture is growing a lactose .  A

chest x-ray is negative for _____ infiltrate.  

 

PHYSICAL EXAMINATION: 

General:  The patient is awake and alert.  She is morbidly obese.  In no acute

distress.  

Vital Signs:  Temperature 99.9, pulse 76 and regular, blood pressure 171/56,

respiratory rate 20 per minute.  T-max of 102.5.

HEENT::  Sclerae anicteric.  

Heart:  Heart sounds S1, S2.  

Lungs:  Clear. 

Abdomen:  Soft, obese, nontender.  

Extremities:  There is bilateral lower extremity edema and bilateral lower extremity

leg ulcers with malodorous serious drainage.  The legs are warm.  There is chronic

venous stasis dermatitis and hyperpigmentation but no erythema is noted. 

 

IMPRESSION: 

1.  Bacteremia/sepsis, likely secondary to skin source. 

2.  Infected chronic venous stasis ulcers.

3.  Urinary tract infection. 

4.  Marked leukocytosis and fever.  

 

PLAN:  

1.  Await culture results.  

2.  Antibiotic coverage with vancomycin and Zosyn. 

3.  Local wound care. 

4.  Will follow. 

  

Thank you for the kind referral. 

 

 

 

MARTITA PARSONS M.D.

 

[id number]

DD: 11/01/2018 13:59

DT: 11/01/2018 14:49

Job #:  [id number]

## 2018-11-01 NOTE — CON.CARD
Cardiology Consult (text)





- Consultation


Consultation Note: 





cc:  fever





hpi:  68 f hx morbid obesity, venous insuff/edema, chronic leg wounds, dm, htn, 

anemia, here with fever.  Being treated by vascular with hyperbaric chamber for 

leg wounds.  Yesterday she felt fever so came to ER.  Found to have sepsis, 

likely from cellulitis of leg.  Also yesterday she noticed a central chest 

heaviness that has decreased today.  No sob palps dizzy loc pnd orthopnea.  No 

hx hrt dz.





pmh: per hpi


psh: tubal ligation


social: no tob


fam: no premature cad, scd


ros: per hpi; no nvd, gib hematuria dysuria muscle pain wt loss


meds:


 Home Medications











 Medication  Instructions  Recorded


 


Aspirin [Baby Aspirin] 81 mg PO DAILY 05/17/12


 


Furosemide [Lasix] 20 mg PO DAILY 05/17/12


 


Montelukast Na [Singulair] 10 mg PO HS 05/17/12


 


Gabapentin 1 cap PO TID 02/11/14


 


Amlodipine Besylate [Norvasc -] 5 mg PO BID 03/31/14


 


Ascorbic Acid [Vitamin C] 250 mg PO DAILY 03/31/14


 


Cholecalciferol (Vitamin D3) 1,000 unit PO DAILY 03/31/14





[Vitamin D]  


 


Docosahexanoic Acid/Epa [Fish Oil 1 each PO DAILY 03/31/14





Softgel]  


 


Glyburide 5 mg PO BID 03/31/14


 


Ibuprofen [Advil -] 400 mg PO QID PRN 03/31/14


 


Insulin Glargine,Hum.rec.anlog 30 units SCJ DAILY 03/31/14





[Lantus Solostar PEN -]  


 


Losartan/Hydrochlorothiazide 1 tab PO DAILY 03/31/14





[Losartan-Hctz 100-25 mg Tab]  


 


Meloxicam [Mobic -] 15 mg PO DAILY 03/31/14


 


Tramadol HCl 50 mg PO QID PRN 03/31/14


 


Vitamin B Complex 1 each PO DAILY 03/31/14


 


oxyCODONE HCL [Roxicodone -] 5 mg PO Q6H PRN 03/31/14


 


Clonidine 0.1 mg PO HS 07/15/14


 


Cyclobenzaprine HCl 10 mg PO HS 05/12/15








pe:


 Vital Signs











 Period  Temp  Pulse  Resp  BP Sys/Guevara  Pulse Ox


 


 Last 24 Hr  97.6 F-102.9 F    16-19  139-186/52-85  








nad no jvd


rrr s1s2 no mrg


cta bl nl eff


aaox3


trace le edema, nonpitting edema, chronic stasis changes of skin


abd nt nd pos bs


no jaundice diaphoresis


pos dp pt no carotid bruits





 Laboratory Last Values











WBC  20.1 K/mm3 (4.0-10.0)  H  11/01/18  06:15    


 


RBC  3.01 M/mm3 (3.60-5.2)  L  11/01/18  06:15    


 


Hgb  7.4 GM/dL (10.7-15.3)  L  11/01/18  06:15    


 


Hct  24.1 % (32.4-45.2)  L  11/01/18  06:15    


 


MCV  80.0 fl (80-96)   11/01/18  06:15    


 


MCH  24.7 pg (25.7-33.7)  L  11/01/18  06:15    


 


MCHC  30.8 g/dl (32.0-36.0)  L  11/01/18  06:15    


 


RDW  16.4 % (11.6-15.6)  H  11/01/18  06:15    


 


Plt Count  313 K/MM3 (134-434)   11/01/18  06:15    


 


MPV  7.5 fl (7.5-11.1)   11/01/18  06:15    


 


Absolute Neuts (auto)  18.5 K/mm3 (1.5-8.0)  H  10/31/18  14:24    


 


Neutrophils %  94.2 % (42.8-82.8)  H D 10/31/18  14:24    


 


Neutrophils % (Manual)  90.0 % (42.8-82.8)  H  10/31/18  14:24    


 


Band Neutrophils %  5.0 %  10/31/18  14:24    


 


Lymphocytes %  1.5 % (8-40)  L D 10/31/18  14:24    


 


Lymphocytes % (Manual)  1.0 % (8-40)  L  10/31/18  14:24    


 


Monocytes %  3.2 % (3.8-10.2)  L  10/31/18  14:24    


 


Monocytes % (Manual)  2 % (3.8-10.2)  L  10/31/18  14:24    


 


Eosinophils %  0.1 % (0-4.5)  D 10/31/18  14:24    


 


Eosinophils % (Manual)  0.0 % (0-4.5)   10/31/18  14:24    


 


Basophils %  1.0 % (0-2.0)   10/31/18  14:24    


 


Basophils % (Manual)  0.0 % (0-2.0)   10/31/18  14:24    


 


Myelocytes % (Man)  1 % (0-2)   10/31/18  14:24    


 


Promyelocytes % (Man)  0 % (0-2)   10/31/18  14:24    


 


Blast Cells % (Manual)  0 % (0-0)   10/31/18  14:24    


 


Nucleated RBC %  0 % (0-0)   10/31/18  14:24    


 


Metamyelocytes  1 % (0-2)   10/31/18  14:24    


 


Hypochromia  0   10/31/18  14:24    


 


Platelet Estimate  Normal   10/31/18  14:24    


 


Polychromasia  0   10/31/18  14:24    


 


Poikilocytosis  0   10/31/18  14:24    


 


Anisocytosis  0   10/31/18  14:24    


 


Microcytosis  3+   10/31/18  14:24    


 


Macrocytosis  0   10/31/18  14:24    


 


ESR  118 mm/hr (0-30)  H  11/01/18  06:15    


 


PT with INR  13.50 SEC (9.7-13.0)  H  10/31/18  14:24    


 


INR  1.14  (0.83-1.09)  H  10/31/18  14:24    


 


PTT (Actin FS)  32.6 SECONDS (25.2-36.5)   10/31/18  14:24    


 


VBG pH  7.36  (7.32-7.42)   10/31/18  14:24    


 


POC VBG pCO2  42.9 mmHg (38-52)   10/31/18  14:24    


 


POC VBG pO2  23.3 mmHg (28-48)  L  10/31/18  14:24    


 


Mixed VBG HCO3  23.7 meq/L (19-25)   10/31/18  14:24    


 


Sodium  139 mmol/L (136-145)   11/01/18  06:15    


 


Potassium  4.5 mmol/L (3.5-5.1)   11/01/18  06:15    


 


Chloride  106 mmol/L ()   11/01/18  06:15    


 


Carbon Dioxide  25 mmol/L (21-32)   11/01/18  06:15    


 


Anion Gap  8 MMOL/L (8-16)   11/01/18  06:15    


 


BUN  34 mg/dL (7-18)  H  11/01/18  06:15    


 


Creatinine  1.4 mg/dL (0.55-1.3)  H  11/01/18  06:15    


 


Creat Clearance w eGFR  37.51  (>60)   11/01/18  06:15    


 


POC Glucometer  138.04706 UNITS ()   11/01/18  05:13    


 


Random Glucose  95 mg/dL ()   11/01/18  06:15    


 


Hemoglobin A1c %  6.4 % (4.2-6.3)  H  11/01/18  06:15    


 


Lactic Acid  0.8 mmol/L (0.4-2.0)   10/31/18  14:24    


 


Calcium  8.4 mg/dL (8.5-10.1)  L  11/01/18  06:15    


 


Phosphorus  3.0 mg/dL (2.5-4.9)   11/01/18  06:15    


 


Magnesium  2.0 mg/dL (1.8-2.4)   11/01/18  06:15    


 


Total Bilirubin  0.3 mg/dL (0.2-1)   10/31/18  14:24    


 


AST  10 U/L (15-37)  L  10/31/18  14:24    


 


ALT  15 U/L (13-61)   10/31/18  14:24    


 


Alkaline Phosphatase  56 U/L ()   10/31/18  14:24    


 


Creatine Kinase  126 IU/L ()   11/01/18  06:15    


 


CK-MB (CK-2)  < 1.0 ng/mL (0.5-3.6)   10/31/18  14:24    


 


Troponin I  1.62 ng/ml (0.00-0.05)  H*  11/01/18  06:15    


 


C-Reactive Protein  16.6 MG/DL (0.00-0.3)  H  11/01/18  06:15    


 


Total Protein  8.5 g/dl (6.4-8.2)  H  10/31/18  14:24    


 


Albumin  3.1 g/dl (3.4-5.0)  L  10/31/18  14:24    


 


Urine Color  Ltyellow   10/31/18  14:50    


 


Urine Appearance  Slcloudy   10/31/18  14:50    


 


Urine pH  5.0  (5.0-8.0)   10/31/18  14:50    


 


Ur Specific Gravity  1.011  (1.010-1.035)   10/31/18  14:50    


 


Urine Protein  2+  (NEGATIVE)  H  10/31/18  14:50    


 


Urine Glucose (UA)  Negative  (NEGATIVE)   10/31/18  14:50    


 


Urine Ketones  Negative  (NEGATIVE)   10/31/18  14:50    


 


Urine Blood  2+  (NEGATIVE)  H  10/31/18  14:50    


 


Urine Nitrite  Negative  (NEGATIVE)   10/31/18  14:50    


 


Urine Bilirubin  Negative  (<2.0 mg/dL)   10/31/18  14:50    


 


Urine Urobilinogen  Negative mg/dL (0.2-1.0)   10/31/18  14:50    


 


Ur Leukocyte Esterase  2+  (NEGATIVE)  H  10/31/18  14:50    


 


Urine WBC (Auto)  79 /hpf (3-5)   10/31/18  14:50    


 


Urine RBC (Auto)  30 /hpf (0-3)   10/31/18  14:50    


 


Urine Bacteria  Rare /hpf (NONE SEEN)   10/31/18  14:50    


 


Urine Mucus  Rare   10/31/18  14:50    


 


Blood Type  O POSITIVE   11/01/18  09:23    


 


Antibody Screen  Negative   11/01/18  09:23    


 


Crossmatch  See Detail   11/01/18  09:23    








cxr: clear lungs





ecg:  sr, nl intervals, no ischemic changes





echo 10/2018:  mild lvh, nl lv/rv, no sig valve path











a/p:  68 f hx morbid obesity, venous insuff/edema, chronic leg wounds, dm, htn, 

anemia, here with fever.





sepsis, cellulitis:


-abx per PMD, ID





venous insuff/le edema:


-on lasix at home, holding here due to santi likely from sepsis





htn:


-cont home meds





anemia:


-pt has baseline anemia but hgb dropped into 7's today, no obvious bleeding, 

may just be dilutional from ivfs she got.  getting prbcs, monitor hgb





NSTEMI:


-trop was normal then repeat increased to 1.6.  Possibly just related to sepsis 

and santi but she did mention chest pressure and the jump from normal to 1.6 may 

be consistent with ACS.  ECG and echo unremarkable.  Already getting asa.  Will 

start hep gtt for 72 hours for nstemi.  Will hold off on plavix for now given 

her hgb in 7s.  Monitor hgb.  Tele.  Trend trops.  Started lipitor.

## 2018-11-01 NOTE — CONSULT
<Devyn Rodriguez - Last Filed: 11/01/18 15:26>





- Consultation


REQUESTING PROVIDER: 





CONSULT REQUEST: We have been asked to surgically evaluate this patient for 

bilateral leg ulcers





PCP:Bay Kraft MD





HISTORY OF PRESENT ILLNESS:


67yo F with long history of bilateral venous stasis ulcer, pt is known to Dr. Cruz and the wound care center.  Pt was supposed to be seen yesterday in 

clinic but was sent the the ED for fevers.  Pt denies any major changes in her 

wounds.  Has been using silver aginate and compression dressings.  





PMHx: DM, HTN, 





     


 Home Medications











 Medication  Instructions  Recorded


 


Aspirin [Baby Aspirin] 81 mg PO DAILY 05/17/12


 


Furosemide [Lasix] 20 mg PO DAILY 05/17/12


 


Montelukast Na [Singulair] 10 mg PO HS 05/17/12


 


Gabapentin 1 cap PO TID 02/11/14


 


Amlodipine Besylate [Norvasc -] 5 mg PO BID 03/31/14


 


Ascorbic Acid [Vitamin C] 250 mg PO DAILY 03/31/14


 


Cholecalciferol (Vitamin D3) 1,000 unit PO DAILY 03/31/14





[Vitamin D]  


 


Docosahexanoic Acid/Epa [Fish Oil 1 each PO DAILY 03/31/14





Softgel]  


 


Glyburide 5 mg PO BID 03/31/14


 


Ibuprofen [Advil -] 400 mg PO QID PRN 03/31/14


 


Insulin Glargine,Hum.rec.anlog 30 units SCJ DAILY 03/31/14





[Lantus Solostar PEN -]  


 


Losartan/Hydrochlorothiazide 1 tab PO DAILY 03/31/14





[Losartan-Hctz 100-25 mg Tab]  


 


Meloxicam [Mobic -] 15 mg PO DAILY 03/31/14


 


Tramadol HCl 50 mg PO QID PRN 03/31/14


 


Vitamin B Complex 1 each PO DAILY 03/31/14


 


oxyCODONE HCL [Roxicodone -] 5 mg PO Q6H PRN 03/31/14


 


Clonidine 0.1 mg PO HS 07/15/14


 


Cyclobenzaprine HCl 10 mg PO HS 05/12/15








 Allergies











Allergy/AdvReac Type Severity Reaction Status Date / Time


 


No Known Allergies Allergy   Verified 10/31/18 14:17














PHYSICAL EXAM:


GENERAL: Awake, alert, and fully oriented, in no acute distress.


HEAD: Normal with no signs of trauma.


EYES: PERRL, sclera anicteric, conjunctiva clear.


NECK: Normal ROM, 


LOWER EXTREMITIES: 2+ pulses, warm, well-perfused. RLE shows three ulcers on 

anterior aspect nearly circumferential, no erythema serous discharge.  LLE 

shows 10x 8cm ulcer on medial calf, 2cm x 3cm ulcer lateral aspect, 3 x 3cm 

ulcer on heel, no erythema and serous discharge.  


NEUROLOGICAL: Normal speech, gait not observed.


PSYCH: Cooperative. Good eye contact. Appropriate mood and affect.


SKIN: Warm, dry, normal turgor, no rashes or lesions noted.








 Vital Signs











Temperature  99.8 F H  11/01/18 14:29


 


Pulse Rate  82   11/01/18 14:29


 


Respiratory Rate  18   11/01/18 14:29


 


Blood Pressure  155/51 L  11/01/18 14:29


 


O2 Sat by Pulse Oximetry (%)  96   11/01/18 14:29








 Lab Results











WBC  20.1 K/mm3 (4.0-10.0)  H  11/01/18  06:15    


 


RBC  3.01 M/mm3 (3.60-5.2)  L  11/01/18  06:15    


 


Hgb  7.4 GM/dL (10.7-15.3)  L  11/01/18  06:15    


 


Hct  24.1 % (32.4-45.2)  L  11/01/18  06:15    


 


MCV  80.0 fl (80-96)   11/01/18  06:15    


 


MCHC  30.8 g/dl (32.0-36.0)  L  11/01/18  06:15    


 


RDW  16.4 % (11.6-15.6)  H  11/01/18  06:15    


 


Plt Count  313 K/MM3 (134-434)   11/01/18  06:15    


 


Sodium  139 mmol/L (136-145)   11/01/18  06:15    


 


Potassium  4.5 mmol/L (3.5-5.1)   11/01/18  06:15    


 


Chloride  106 mmol/L ()   11/01/18  06:15    


 


Carbon Dioxide  25 mmol/L (21-32)   11/01/18  06:15    


 


Anion Gap  8 MMOL/L (8-16)   11/01/18  06:15    


 


BUN  34 mg/dL (7-18)  H  11/01/18  06:15    


 


Creatinine  1.4 mg/dL (0.55-1.3)  H  11/01/18  06:15    


 


Random Glucose  95 mg/dL ()   11/01/18  06:15    


 


Calcium  8.4 mg/dL (8.5-10.1)  L  11/01/18  06:15    


 


Blood Type  O POSITIVE   11/01/18  10:26    


 


Antibody Screen  Negative   11/01/18  09:23    


 


INR  1.14  (0.83-1.09)  H  10/31/18  14:24    














Assessment: Bilateral venous stasis ulcers





Plan: 


   -silver aginate and compression dressings with ace bandages to legs 

bilaterally


   -elevate legs 


   -follow up with Dr. Cruz in wound care clinic as outpatient





<Sage Cruz - Last Filed: 11/02/18 08:36>





- Consultation


REQUESTING PROVIDER: 





CONSULT REQUEST: We have been asked to surgically evaluate this patient for (

specify).





PCP:Pranay Wiseman MD





HISTORY OF PRESENT ILLNESS:





PMHx:          





PSHx:        


 Home Medications











 Medication  Instructions  Recorded


 


Aspirin [Baby Aspirin] 81 mg PO DAILY 05/17/12


 


Furosemide [Lasix] 20 mg PO DAILY 05/17/12


 


Montelukast Na [Singulair] 10 mg PO HS 05/17/12


 


Gabapentin 1 cap PO TID 02/11/14


 


Amlodipine Besylate [Norvasc -] 5 mg PO BID 03/31/14


 


Ascorbic Acid [Vitamin C] 250 mg PO DAILY 03/31/14


 


Cholecalciferol (Vitamin D3) 1,000 unit PO DAILY 03/31/14





[Vitamin D]  


 


Docosahexanoic Acid/Epa [Fish Oil 1 each PO DAILY 03/31/14





Softgel]  


 


Glyburide 5 mg PO BID 03/31/14


 


Ibuprofen [Advil -] 400 mg PO QID PRN 03/31/14


 


Insulin Glargine,Hum.rec.anlog 30 units SCJ DAILY 03/31/14





[Lantus Solostar PEN -]  


 


Losartan/Hydrochlorothiazide 1 tab PO DAILY 03/31/14





[Losartan-Hctz 100-25 mg Tab]  


 


Meloxicam [Mobic -] 15 mg PO DAILY 03/31/14


 


Tramadol HCl 50 mg PO QID PRN 03/31/14


 


Vitamin B Complex 1 each PO DAILY 03/31/14


 


oxyCODONE HCL [Roxicodone -] 5 mg PO Q6H PRN 03/31/14


 


Clonidine 0.1 mg PO HS 07/15/14


 


Cyclobenzaprine HCl 10 mg PO HS 05/12/15








 Allergies











Allergy/AdvReac Type Severity Reaction Status Date / Time


 


No Known Allergies Allergy   Verified 10/31/18 14:17








REVIEW OF SYSTEMS:


CONSTITUTIONAL: 


Absent:  fever, chills, diaphoresis, generalized weakness, malaise, loss of 

appetite, weight change


CARDIOVASCULAR: 


Absent: chest pain, syncope, palpitations, irregular heart rate, lightheadedness

, peripheral edema


RESPIRATORY: 


Absent: cough, shortness of breath, dyspnea with exertion, wheezing, stridor, 

hemoptysis


GASTROINTESTINAL:


Absent: abdominal pain, abdominal distension, nausea, vomiting, diarrhea, 

constipation, melena, hematochezia


GENITOURINARY: 


Absent: dysuria, frequency, urgency, hesitancy, hematuria, flank pain, genital 

pain


MUSCULOSKELETAL: 


Absent: myalgia, arthralgia, joint swelling, back pain, neck pain


SKIN: 


Absent: rash, itching, pallor


HEMATOLOGIC/IMMUNOLOGIC: 


Absent: easy bleeding, easy bruising, lymphadenopathy


NEUROLOGIC: 


Absent: headache, focal weakness, paresthesias, dizziness, unsteady gait, 

seizure, mental status changes, 


bladder or bowel incontinence


PSYCHIATRIC: 


Absent: anxiety, depression, suicidal or homicidal ideation, hallucinations.





PHYSICAL EXAM:


GENERAL: Awake, alert, and fully oriented, in no acute distress.


HEAD: Normal with no signs of trauma.


EYES: PERRL, sclera anicteric, conjunctiva clear.


NECK: Normal ROM, supple without lymphadenopathy, JVD, or masses.


LUNGS: Clear to auscultation bilat anteriorly. No wheezes, and no crackles. No 

accessory muscle use.


HEART: Regular rate and rhythm. No murmurs


ABDOMEN: Soft, nontender, not distended, normoactive bowel sounds, no guarding, 

no rebound, no masses.  No organomegaly. 


MUSCULOSKELETAL: Normal ROM at all joints. No bony deformities or tenderness. 

No CVA tenderness.


UPPER EXTREMITIES: 2+ pulses, warm, well-perfused. No cyanosis. Cap refill <2 

seconds. No peripheral edema.


LOWER EXTREMITIES: 2+ pulses, warm, well-perfused. No calf tenderness. No 

peripheral edema. 


NEUROLOGICAL: Normal speech, gait not observed.


PSYCH: Cooperative. Good eye contact. Appropriate mood and affect.


SKIN: Warm, dry, normal turgor, no rashes or lesions noted.


 Vital Signs











Temperature  98.1 F   11/02/18 04:51


 


Pulse Rate  91 H  11/02/18 04:51


 


Respiratory Rate  20   11/02/18 04:51


 


Blood Pressure  144/54 L  11/02/18 04:51


 


O2 Sat by Pulse Oximetry (%)  98   11/02/18 04:51








 Lab Results











WBC  14.8 K/mm3 (4.0-10.0)  H  11/02/18  06:25    


 


RBC  3.42 M/mm3 (3.60-5.2)  L  11/02/18  06:25    


 


Hgb  8.6 GM/dL (10.7-15.3)  L  11/02/18  06:25    


 


Hct  27.6 % (32.4-45.2)  L  11/02/18  06:25    


 


MCV  80.7 fl (80-96)   11/02/18  06:25    


 


MCHC  31.1 g/dl (32.0-36.0)  L  11/02/18  06:25    


 


RDW  15.9 % (11.6-15.6)  H  11/02/18  06:25    


 


Plt Count  288 K/MM3 (134-434)   11/02/18  06:25    


 


Sodium  134 mmol/L (136-145)  L  11/02/18  06:25    


 


Potassium  3.9 mmol/L (3.5-5.1)   11/02/18  06:25    


 


Chloride  102 mmol/L ()   11/02/18  06:25    


 


Carbon Dioxide  24 mmol/L (21-32)   11/02/18  06:25    


 


Anion Gap  9 MMOL/L (8-16)   11/02/18  06:25    


 


BUN  26 mg/dL (7-18)  H  11/02/18  06:25    


 


Creatinine  1.2 mg/dL (0.55-1.3)   11/02/18  06:25    


 


Random Glucose  200 mg/dL ()  H  11/02/18  06:25    


 


Calcium  7.9 mg/dL (8.5-10.1)  L  11/02/18  06:25    


 


Blood Type  O POSITIVE   11/01/18  10:26    


 


Antibody Screen  Negative   11/01/18  09:23    


 


INR  1.14  (0.83-1.09)  H  10/31/18  14:24    








Patient well known to me.


Chronic morbid obesity, lymphedema and stasis dermatitis with  chronic 

ulceration of both ankles. She also has developed a new ulcer on the left heel 

over the past 2 months. She has been receiving HBO treatments and was found to 

have shaking chills and was sent to the ER.





Her wound appear unchanged with surface exudate.


Wound care with Alginate dressings and compression wraps ordered. Leg elevation 

ordered but patient does not comply.

## 2018-11-01 NOTE — ECHO
______________________________________________________________________________



Name: ALEXANDR LACEY                                  Exam:Adult Echocardiogram

MRN: [id number]         Study Date: 2018 07:22 AM

Age: 68 yrs

______________________________________________________________________________



Reason For Study: MRSA leg edema chest pain

Height: 67 in        Weight: 342 lb        BSA: 2.5 m2



______________________________________________________________________________



MMode/2D Measurements & Calculations

IVSd: 1.3 cm                               Ao root diam: 3.0 cm

LVIDd: 5.1 cm                              LA dimension: 4.2 cm

LVIDs: 2.8 cm                              ACS: 1.9 cm

LVPWd: 1.2 cm

IVSs: 1.5 cm



____________________________________________

LVPWs: 1.3 cm                              EDV(Teich): 125.6 ml

                                           ESV(Teich): 28.8 ml



Doppler Measurements & Calculations

MV E max bassam: 78.0 cm/sec                            Ao V2 max: 182.8 cm/sec

MV A max bassam: 97.0 cm/sec                            Ao max P.4 mmHg

MV E/A: 0.80                                         Ao V2 mean: 141.3 cm/sec

                                                     Ao mean P.2 mmHg

                                                     Ao V2 VTI: 33.3 cm



______________________________________________________

MR max bassam: 445.2 cm/sec                             TR max bassam: 302.0 cm/sec

MR max P.4 mmHg                                 TR max P.5 mmHg



______________________________________________________

PA V2 max: 151.3 cm/sec                              Med Peak E' Bassam: 5.1 cm/sec

PA max P.2 mmHg                                  Med E/e': 15.4

PA V2 mean: 107.6 cm/sec                             Lat Peak E' Bassam: 4.3 cm/sec

PA mean P.2 mmHg                                 Lat E/e': 18.2

PA V2 VTI: 26.9 cm





______________________________________________________________________________

Procedure

A complete two-dimensional transthoracic echocardiogram was performed (2D, M-mode, Doppler and color 
flow

Doppler). The study was technically difficult with many images being suboptimal in quality.

Left Ventricle

There is mild concentric left ventricular hypertrophy. The left ventricular ejection fraction is norm
al.

Ejection Fraction = 60-65%. The left ventricular wall motion is normal.

Right Ventricle

The right ventricle is not well visualized. The right ventricle is grossly normal size. The right dashawn
tricular

systolic function is grossly normal.

Atria

Normal left and right atrial size and function.

Mitral Valve

There is no mitral regurgitation noted.

Tricuspid Valve

There is trace tricuspid regurgitation. There was insufficient TR detected to calculate RV systolic p
ressure.

Aortic Valve

No hemodynamically significant valvular aortic stenosis. No aortic regurgitation is present.

Pulmonic Valve

There is no pulmonic valvular regurgitation.

Great Vessels

The aortic root is normal size.

Pericardium/Pleura

There is no pericardial effusion.



______________________________________________________________________________



Interpretation Summary

The study was technically difficult with many images being suboptimal in quality.

There is mild concentric left ventricular hypertrophy.

The left ventricular ejection fraction is normal.

The left ventricular wall motion is normal.

The right ventricle is not well visualized.

The right ventricle is grossly normal size.

The right ventricular systolic function is grossly normal.

There is trace tricuspid regurgitation.





MD Shad Mercado 2018 11:10 AM

## 2018-11-01 NOTE — PN
Progress Note (short form)





- Note


Progress Note: 





ID Consult dictated


Bacteremia/ sepsis, likely secondary to skin source


UTI


Morbid obesity


Pending culture results, continue vancomycin/ zosyn

## 2018-11-02 LAB
ANION GAP SERPL CALC-SCNC: 9 MMOL/L (ref 8–16)
BASOPHILS # BLD: 0.3 % (ref 0–2)
BUN SERPL-MCNC: 26 MG/DL (ref 7–18)
CALCIUM SERPL-MCNC: 7.9 MG/DL (ref 8.5–10.1)
CHLORIDE SERPL-SCNC: 102 MMOL/L (ref 98–107)
CO2 SERPL-SCNC: 24 MMOL/L (ref 21–32)
CREAT SERPL-MCNC: 1.2 MG/DL (ref 0.55–1.3)
DEPRECATED RDW RBC AUTO: 15.9 % (ref 11.6–15.6)
EOSINOPHIL # BLD: 0.5 % (ref 0–4.5)
GLUCOSE SERPL-MCNC: 200 MG/DL (ref 74–106)
HCT VFR BLD CALC: 27.6 % (ref 32.4–45.2)
HGB BLD-MCNC: 8.6 GM/DL (ref 10.7–15.3)
LYMPHOCYTES # BLD: 9.1 % (ref 8–40)
MAGNESIUM SERPL-MCNC: 2 MG/DL (ref 1.8–2.4)
MCH RBC QN AUTO: 25.1 PG (ref 25.7–33.7)
MCHC RBC AUTO-ENTMCNC: 31.1 G/DL (ref 32–36)
MCV RBC: 80.7 FL (ref 80–96)
MONOCYTES # BLD AUTO: 6.6 % (ref 3.8–10.2)
NEUTROPHILS # BLD: 83.5 % (ref 42.8–82.8)
PHOSPHATE SERPL-MCNC: 3.4 MG/DL (ref 2.5–4.9)
PLATELET # BLD AUTO: 288 K/MM3 (ref 134–434)
PMV BLD: 7.8 FL (ref 7.5–11.1)
POTASSIUM SERPLBLD-SCNC: 3.9 MMOL/L (ref 3.5–5.1)
RBC # BLD AUTO: 3.42 M/MM3 (ref 3.6–5.2)
SODIUM SERPL-SCNC: 134 MMOL/L (ref 136–145)
WBC # BLD AUTO: 14.8 K/MM3 (ref 4–10)

## 2018-11-02 RX ADMIN — GABAPENTIN SCH MG: 100 CAPSULE ORAL at 00:25

## 2018-11-02 RX ADMIN — CYCLOBENZAPRINE HYDROCHLORIDE SCH MG: 10 TABLET, FILM COATED ORAL at 00:25

## 2018-11-02 RX ADMIN — GABAPENTIN SCH MG: 100 CAPSULE ORAL at 15:54

## 2018-11-02 RX ADMIN — VANCOMYCIN HYDROCHLORIDE SCH MLS/HR: 1 INJECTION, POWDER, LYOPHILIZED, FOR SOLUTION INTRAVENOUS at 12:06

## 2018-11-02 RX ADMIN — AMLODIPINE BESYLATE SCH MG: 5 TABLET ORAL at 10:56

## 2018-11-02 RX ADMIN — Medication SCH MG: at 10:57

## 2018-11-02 RX ADMIN — INSULIN ASPART SCH UNIT: 100 INJECTION, SOLUTION INTRAVENOUS; SUBCUTANEOUS at 22:02

## 2018-11-02 RX ADMIN — ACETAMINOPHEN PRN MG: 325 TABLET ORAL at 00:45

## 2018-11-02 RX ADMIN — CEFTRIAXONE SODIUM SCH MLS/HR: 2 INJECTION, POWDER, FOR SOLUTION INTRAMUSCULAR; INTRAVENOUS at 21:40

## 2018-11-02 RX ADMIN — INSULIN ASPART SCH UNIT: 100 INJECTION, SOLUTION INTRAVENOUS; SUBCUTANEOUS at 22:00

## 2018-11-02 RX ADMIN — LOSARTAN POTASSIUM AND HYDROCHLOROTHIAZIDE TABLETS SCH TAB: 50; 12.5 TABLET, FILM COATED ORAL at 12:06

## 2018-11-02 RX ADMIN — INSULIN DETEMIR SCH: 100 INJECTION, SOLUTION SUBCUTANEOUS at 21:46

## 2018-11-02 RX ADMIN — PIPERACILLIN AND TAZOBACTAM SCH MLS/HR: 4; .5 INJECTION, POWDER, LYOPHILIZED, FOR SOLUTION INTRAVENOUS at 11:46

## 2018-11-02 RX ADMIN — HEPARIN SODIUM PRN UNIT: 5000 INJECTION, SOLUTION INTRAVENOUS; SUBCUTANEOUS at 23:00

## 2018-11-02 RX ADMIN — INSULIN ASPART SCH UNIT: 100 INJECTION, SOLUTION INTRAVENOUS; SUBCUTANEOUS at 00:16

## 2018-11-02 RX ADMIN — AMLODIPINE BESYLATE SCH MG: 5 TABLET ORAL at 21:47

## 2018-11-02 RX ADMIN — HEPARIN SODIUM PRN UNIT: 5000 INJECTION, SOLUTION INTRAVENOUS; SUBCUTANEOUS at 09:31

## 2018-11-02 RX ADMIN — GLYBURIDE SCH: 5 TABLET ORAL at 18:28

## 2018-11-02 RX ADMIN — CYCLOBENZAPRINE HYDROCHLORIDE SCH MG: 10 TABLET, FILM COATED ORAL at 21:47

## 2018-11-02 RX ADMIN — GABAPENTIN SCH MG: 100 CAPSULE ORAL at 21:47

## 2018-11-02 RX ADMIN — ATORVASTATIN CALCIUM SCH MG: 40 TABLET, FILM COATED ORAL at 00:25

## 2018-11-02 RX ADMIN — VANCOMYCIN HYDROCHLORIDE SCH MLS/HR: 1 INJECTION, POWDER, LYOPHILIZED, FOR SOLUTION INTRAVENOUS at 00:25

## 2018-11-02 RX ADMIN — ATORVASTATIN CALCIUM SCH MG: 40 TABLET, FILM COATED ORAL at 21:47

## 2018-11-02 RX ADMIN — SODIUM CHLORIDE SCH MLS/HR: 9 INJECTION, SOLUTION INTRAVENOUS at 12:01

## 2018-11-02 RX ADMIN — MONTELUKAST SODIUM SCH MG: 10 TABLET, COATED ORAL at 21:48

## 2018-11-02 RX ADMIN — GABAPENTIN SCH MG: 100 CAPSULE ORAL at 06:53

## 2018-11-02 RX ADMIN — VITAMIN C SCH EACH: TAB at 10:57

## 2018-11-02 RX ADMIN — ASPIRIN 81 MG SCH MG: 81 TABLET ORAL at 10:56

## 2018-11-02 RX ADMIN — INSULIN DETEMIR SCH UNIT: 100 INJECTION, SOLUTION SUBCUTANEOUS at 00:16

## 2018-11-02 RX ADMIN — MONTELUKAST SODIUM SCH MG: 10 TABLET, COATED ORAL at 00:25

## 2018-11-02 RX ADMIN — VITAMIN D, TAB 1000IU (100/BT) SCH UNIT: 25 TAB at 10:57

## 2018-11-02 RX ADMIN — PIPERACILLIN AND TAZOBACTAM SCH: 4; .5 INJECTION, POWDER, LYOPHILIZED, FOR SOLUTION INTRAVENOUS at 18:28

## 2018-11-02 RX ADMIN — INSULIN ASPART SCH UNIT: 100 INJECTION, SOLUTION INTRAVENOUS; SUBCUTANEOUS at 16:27

## 2018-11-02 RX ADMIN — INSULIN ASPART SCH: 100 INJECTION, SOLUTION INTRAVENOUS; SUBCUTANEOUS at 18:27

## 2018-11-02 RX ADMIN — INSULIN DETEMIR SCH UNIT: 100 INJECTION, SOLUTION SUBCUTANEOUS at 08:00

## 2018-11-02 RX ADMIN — BUDESONIDE AND FORMOTEROL FUMARATE DIHYDRATE SCH PUFF: 160; 4.5 AEROSOL RESPIRATORY (INHALATION) at 23:01

## 2018-11-02 RX ADMIN — PIPERACILLIN AND TAZOBACTAM SCH MLS/HR: 4; .5 INJECTION, POWDER, LYOPHILIZED, FOR SOLUTION INTRAVENOUS at 02:10

## 2018-11-02 RX ADMIN — AMLODIPINE BESYLATE SCH MG: 5 TABLET ORAL at 00:25

## 2018-11-02 RX ADMIN — GLYBURIDE SCH MG: 5 TABLET ORAL at 08:27

## 2018-11-02 NOTE — PN
Progress Note (short form)





- Note


Progress Note: 


s: no cp sob palps dizzy





o:


 Vital Signs











 Period  Temp  Pulse  Resp  BP Sys/Guevara  Pulse Ox


 


 Last 24 Hr  98.1 F-101.6 F  82-91  18-22  144-185/49-55  96-99











nad no jvd


rrr s1s2 no mrg


cta bl nl eff


aaox3


trace le edema, nonpitting edema, chronic stasis changes of skin


abd nt nd pos bs


no jaundice diaphoresis





 Current Medications











Generic Name Dose Route Start Last Admin





  Trade Name Freq  PRN Reason Stop Dose Admin


 


Acetaminophen  650 mg  10/31/18 17:56  11/02/18 00:45





  Tylenol -  PO   650 mg





  Q6H PRN   Administration





  FEVER   





     





     





     


 


Amlodipine Besylate  5 mg  10/31/18 22:00  11/02/18 10:56





  Norvasc -  PO   5 mg





  BID [facility]   Administration





     





     





     





     


 


Ascorbic Acid  250 mg  11/01/18 10:00  11/02/18 10:57





  Vitamin C -  PO   250 mg





  DAILY [facility]   Administration





     





     





     





     


 


Aspirin  81 mg  11/01/18 10:00  11/02/18 10:56





  Asa -  PO   81 mg





  DAILY [facility]   Administration





     





     





     





     


 


Atorvastatin Calcium  40 mg  11/01/18 22:00  11/02/18 00:25





  Lipitor -  PO   40 mg





  HS [facility]   Administration





     





     





     





     


 


Budesonide/Formoterol Fumarate  2 puff  11/02/18 22:00  





  Symbicort 160/4.5mcg -  IH   





  BID [facility]   





     





     





     





     


 


Cholecalciferol  1,000 unit  11/01/18 10:00  11/02/18 10:57





  Vitamin D3 -  PO   1,000 unit





  DAILY [facility]   Administration





     





     





     





     


 


Clonidine  0.1 mg  10/31/18 22:00  11/02/18 00:25





  Catapres -  PO   0.1 mg





  HS [facility]   Administration





     





     





     





     


 


Cyclobenzaprine HCl  10 mg  11/01/18 22:00  11/02/18 00:25





  Flexeril -  PO   10 mg





  HS [facility]   Administration





     





     





     





     


 


Gabapentin  100 mg  10/31/18 22:00  11/02/18 06:53





  Neurontin -  PO   100 mg





  TID [facility]   Administration





     





     





     





     


 


Glyburide  5 mg  11/01/18 07:00  11/02/18 08:27





  Diabeta -  PO   5 mg





  BIDAC [facility]   Administration





     





     





     





     


 


HCTZ/Losartan Potassium  2 tab  11/01/18 10:00  11/01/18 10:21





  Hyzaar -  PO   2 tab





  DAILY [facility]   Administration





     





     





     





     


 


Heparin Sodium (Porcine)  1,000 unit  11/01/18 11:44  11/01/18 18:56





  Heparin -  IVPUSH   1,000 unit





  PRN PRN   Administration





  Heparin   





     





     





     


 


Heparin Sodium (Porcine)  5,000 unit  11/01/18 11:44  11/02/18 09:31





  Heparin -  IVPUSH   5,000 unit





  PRN PRN   Administration





  Heparin   





     





     





     


 


Vancomycin HCl 1,500 mg/  500 mls @ 166.667 mls/hr  11/01/18 10:00  11/02/18 00:

25





  Dextrose  IVPB   166.667 mls/hr





  Q12H [facility]   Administration





     





     





  Protocol   





     


 


Piperacillin Sod/Tazobactam  100 mls @ 200 mls/hr  10/31/18 22:00  11/02/18 11:

46





  Sod 4.5 gm/ Dextrose  IVPB   200 mls/hr





  Q8H-IV [facility]   Administration





     





     





  Protocol   





     


 


Heparin Sodium (Porcine) 25,  500 mls @ 20 mls/hr  11/01/18 11:45  11/02/18 09:

30





  000 unit/ Sodium Chloride  IV   1,250 unit/hr





  TITR [facility]   25 mls/hr





     Titration





     





  Protocol   





  1,000 UNIT/HR   


 


Insulin Aspart  1 vial  10/31/18 22:00  11/02/18 00:16





  Novolog Vial Sliding Scale -  SQ   2 unit





  [facility]   Administration





     





     





  Protocol   





     


 


Insulin Detemir  15 units  10/31/18 22:00  11/02/18 00:16





  Levemir Vial  SQ   15 unit





  0700,2200 [facility]   Administration





     





     





     





     


 


Montelukast Sodium  10 mg  10/31/18 22:00  11/02/18 00:25





  Singulair -  PO   10 mg





  HS [facility]   Administration





     





     





     





     


 


Multivitamins  1 each  11/01/18 10:00  11/02/18 10:57





  Total B With C -  PO   1 each





  DAILY [facility]   Administration





     





     





     





     


 


Non-Formulary Medication  1 each  11/01/18 10:00  





  Docosahexanoic Acid/Epa [Fish Oil Softgel]  PO   





  DAILY [facility]   





     





     





     





     


 


Non-Formulary Medication  15 mg  11/01/18 10:00  





  Meloxicam  PO   





  DAILY [facility]   





     





     





     





     


 


Ondansetron HCl  4 mg  10/31/18 17:56  





  Zofran Injection  IVPUSH   





  Q6H PRN   





  NAUSEA   





     





     





     


 


Tramadol HCl  50 mg  10/31/18 18:03  11/02/18 00:45





  Ultram -  PO   50 mg





  Q6H PRN   Administration





  PAIN 4-6   





     





     





     











 Laboratory Last Values











WBC  14.8 K/mm3 (4.0-10.0)  H  11/02/18  06:25    


 


RBC  3.42 M/mm3 (3.60-5.2)  L  11/02/18  06:25    


 


Hgb  8.6 GM/dL (10.7-15.3)  L  11/02/18  06:25    


 


Hct  27.6 % (32.4-45.2)  L  11/02/18  06:25    


 


MCV  80.7 fl (80-96)   11/02/18  06:25    


 


MCH  25.1 pg (25.7-33.7)  L  11/02/18  06:25    


 


MCHC  31.1 g/dl (32.0-36.0)  L  11/02/18  06:25    


 


RDW  15.9 % (11.6-15.6)  H  11/02/18  06:25    


 


Plt Count  288 K/MM3 (134-434)   11/02/18  06:25    


 


MPV  7.8 fl (7.5-11.1)   11/02/18  06:25    


 


Absolute Neuts (auto)  12.3 K/mm3 (1.5-8.0)  H  11/02/18  06:25    


 


Neutrophils %  83.5 % (42.8-82.8)  H  11/02/18  06:25    


 


Neutrophils % (Manual)  90.0 % (42.8-82.8)  H  10/31/18  14:24    


 


Band Neutrophils %  5.0 %  10/31/18  14:24    


 


Lymphocytes %  9.1 % (8-40)  D 11/02/18  06:25    


 


Lymphocytes % (Manual)  1.0 % (8-40)  L  10/31/18  14:24    


 


Monocytes %  6.6 % (3.8-10.2)  D 11/02/18  06:25    


 


Monocytes % (Manual)  2 % (3.8-10.2)  L  10/31/18  14:24    


 


Eosinophils %  0.5 % (0-4.5)  D 11/02/18  06:25    


 


Eosinophils % (Manual)  0.0 % (0-4.5)   10/31/18  14:24    


 


Basophils %  0.3 % (0-2.0)   11/02/18  06:25    


 


Basophils % (Manual)  0.0 % (0-2.0)   10/31/18  14:24    


 


Myelocytes % (Man)  1 % (0-2)   10/31/18  14:24    


 


Promyelocytes % (Man)  0 % (0-2)   10/31/18  14:24    


 


Blast Cells % (Manual)  0 % (0-0)   10/31/18  14:24    


 


Nucleated RBC %  0 % (0-0)   11/02/18  06:25    


 


Metamyelocytes  1 % (0-2)   10/31/18  14:24    


 


Hypochromia  0   10/31/18  14:24    


 


Platelet Estimate  Normal   10/31/18  14:24    


 


Polychromasia  0   10/31/18  14:24    


 


Poikilocytosis  0   10/31/18  14:24    


 


Anisocytosis  0   10/31/18  14:24    


 


Microcytosis  3+   10/31/18  14:24    


 


Macrocytosis  0   10/31/18  14:24    


 


ESR  118 mm/hr (0-30)  H  11/01/18  06:15    


 


PT with INR  13.50 SEC (9.7-13.0)  H  10/31/18  14:24    


 


INR  1.14  (0.83-1.09)  H  10/31/18  14:24    


 


PTT (Actin FS)  37.4 SECONDS (25.2-36.5)  H  11/02/18  08:20    


 


VBG pH  7.36  (7.32-7.42)   10/31/18  14:24    


 


POC VBG pCO2  42.9 mmHg (38-52)   10/31/18  14:24    


 


POC VBG pO2  23.3 mmHg (28-48)  L  10/31/18  14:24    


 


Mixed VBG HCO3  23.7 meq/L (19-25)   10/31/18  14:24    


 


Sodium  134 mmol/L (136-145)  L  11/02/18  06:25    


 


Potassium  3.9 mmol/L (3.5-5.1)   11/02/18  06:25    


 


Chloride  102 mmol/L ()   11/02/18  06:25    


 


Carbon Dioxide  24 mmol/L (21-32)   11/02/18  06:25    


 


Anion Gap  9 MMOL/L (8-16)   11/02/18  06:25    


 


BUN  26 mg/dL (7-18)  H  11/02/18  06:25    


 


Creatinine  1.2 mg/dL (0.55-1.3)   11/02/18  06:25    


 


Creat Clearance w eGFR  44.81  (>60)   11/02/18  06:25    


 


POC Glucometer  162.15387 UNITS ()   11/02/18  00:10    


 


Random Glucose  200 mg/dL ()  H  11/02/18  06:25    


 


Hemoglobin A1c %  6.4 % (4.2-6.3)  H  11/01/18  06:15    


 


Lactic Acid  0.8 mmol/L (0.4-2.0)   10/31/18  14:24    


 


Calcium  7.9 mg/dL (8.5-10.1)  L  11/02/18  06:25    


 


Phosphorus  3.4 mg/dL (2.5-4.9)   11/02/18  06:25    


 


Magnesium  2.0 mg/dL (1.8-2.4)   11/02/18  06:25    


 


Total Bilirubin  0.3 mg/dL (0.2-1)   10/31/18  14:24    


 


AST  10 U/L (15-37)  L  10/31/18  14:24    


 


ALT  15 U/L (13-61)   10/31/18  14:24    


 


Alkaline Phosphatase  56 U/L ()   10/31/18  14:24    


 


Creatine Kinase  165 IU/L ()   11/01/18  11:45    


 


Creatine Kinase Index  0.6 % (0.0-5.0)   11/01/18  11:45    


 


CK-MB (CK-2)  < 1.0 ng/mL (0.5-3.6)   11/01/18  11:45    


 


Troponin I  1.38 ng/ml (0.00-0.05)  H*  11/01/18  11:45    


 


C-Reactive Protein  16.6 MG/DL (0.00-0.3)  H  11/01/18  06:15    


 


Total Protein  8.5 g/dl (6.4-8.2)  H  10/31/18  14:24    


 


Albumin  3.1 g/dl (3.4-5.0)  L  10/31/18  14:24    


 


Urine Color  Ltyellow   10/31/18  14:50    


 


Urine Appearance  Slcloudy   10/31/18  14:50    


 


Urine pH  5.0  (5.0-8.0)   10/31/18  14:50    


 


Ur Specific Gravity  1.011  (1.010-1.035)   10/31/18  14:50    


 


Urine Protein  2+  (NEGATIVE)  H  10/31/18  14:50    


 


Urine Glucose (UA)  Negative  (NEGATIVE)   10/31/18  14:50    


 


Urine Ketones  Negative  (NEGATIVE)   10/31/18  14:50    


 


Urine Blood  2+  (NEGATIVE)  H  10/31/18  14:50    


 


Urine Nitrite  Negative  (NEGATIVE)   10/31/18  14:50    


 


Urine Bilirubin  Negative  (<2.0 mg/dL)   10/31/18  14:50    


 


Urine Urobilinogen  Negative mg/dL (0.2-1.0)   10/31/18  14:50    


 


Ur Leukocyte Esterase  2+  (NEGATIVE)  H  10/31/18  14:50    


 


Urine WBC (Auto)  79 /hpf (3-5)   10/31/18  14:50    


 


Urine RBC (Auto)  30 /hpf (0-3)   10/31/18  14:50    


 


Urine Bacteria  Rare /hpf (NONE SEEN)   10/31/18  14:50    


 


Urine Mucus  Rare   10/31/18  14:50    


 


Blood Type  O POSITIVE   11/01/18  10:26    


 


Antibody Screen  Negative   11/01/18  09:23    


 


Crossmatch  See Detail   11/01/18  09:23    











cxr: clear lungs





ecg:  sr, nl intervals, no ischemic changes





echo 10/2018:  mild lvh, nl lv/rv, no sig valve path











a/p:  68 f hx morbid obesity, venous insuff/edema, chronic leg wounds, dm, htn, 

anemia, here with fever.





sepsis, cellulitis:


-abx per PMD, ID





venous insuff/le edema:


-on lasix at home, holding here due to santi likely from sepsis





htn:


-cont home meds





anemia:


-pt has baseline anemia but hgb dropped into 7's 11/1, no obvious bleeding, may 

just be dilutional from ivfs she got.  got prbcs, hgb 8s today.





NSTEMI:


-trop was normal then repeat increased to 1.6 (now trending down).  Possibly 

just related to sepsis and santi but she did mention chest pressure and the jump 

from normal to 1.6 may be consistent with ACS.  ECG and echo unremarkable.  

Already getting asa.  Will start hep gtt for 72 hours for nstemi.  Will hold 

off on plavix for now given her anemia requiring prbcs.  Monitor hgb.  Tele.  

Trend trops.  Started lipitor.


-ischemic eval after acute issues resolve

## 2018-11-02 NOTE — PN
Progress Note, Physician


Chief Complaint: 


 C/O Left leg pain   no c/o chest pain or SOB





- Current Medication List


Current Medications: 


Active Medications





Acetaminophen (Tylenol -)  650 mg PO Q6H PRN


   PRN Reason: FEVER


   Last Admin: 11/02/18 00:45 Dose:  650 mg


Amlodipine Besylate (Norvasc -)  5 mg PO BID [facility]


   Last Admin: 11/02/18 00:25 Dose:  5 mg


Ascorbic Acid (Vitamin C -)  250 mg PO DAILY [facility]


   Last Admin: 11/01/18 10:21 Dose:  250 mg


Aspirin (Asa -)  81 mg PO DAILY [facility]


   Last Admin: 11/01/18 10:21 Dose:  81 mg


Atorvastatin Calcium (Lipitor -)  40 mg PO HS [facility]


   Last Admin: 11/02/18 00:25 Dose:  40 mg


Cholecalciferol (Vitamin D3 -)  1,000 unit PO DAILY [facility]


   Last Admin: 11/01/18 11:23 Dose:  1,000 unit


Clonidine (Catapres -)  0.1 mg PO HS [facility]


   Last Admin: 11/02/18 00:25 Dose:  0.1 mg


Cyclobenzaprine HCl (Flexeril -)  10 mg PO HS [facility]


   Last Admin: 11/02/18 00:25 Dose:  10 mg


Gabapentin (Neurontin -)  100 mg PO TID [facility]


   Last Admin: 11/02/18 06:53 Dose:  100 mg


Glyburide (Diabeta -)  5 mg PO BIDAC [facility]


   Last Admin: 11/01/18 17:24 Dose:  Not Given


HCTZ/Losartan Potassium (Hyzaar -)  2 tab PO DAILY [facility]


   Last Admin: 11/01/18 10:21 Dose:  2 tab


Heparin Sodium (Porcine) (Heparin -)  1,000 unit IVPUSH PRN PRN


   PRN Reason: Heparin


   Last Admin: 11/01/18 18:56 Dose:  1,000 unit


Heparin Sodium (Porcine) (Heparin -)  5,000 unit IVPUSH PRN PRN


   PRN Reason: Heparin


   Last Admin: 11/01/18 12:44 Dose:  5,000 unit


Vancomycin HCl 1,500 mg/ (Dextrose)  500 mls @ 166.667 mls/hr IVPB Q12H [facility]; 

Protocol


   Last Admin: 11/02/18 00:25 Dose:  166.667 mls/hr


Piperacillin Sod/Tazobactam (Sod 4.5 gm/ Dextrose)  100 mls @ 200 mls/hr IVPB 

Q8H-IV [facility]; Protocol


   Last Admin: 11/02/18 02:10 Dose:  200 mls/hr


Heparin Sodium (Porcine) 25, (000 unit/ Sodium Chloride)  500 mls @ 20 mls/hr 

IV TITR [facility]; Protocol


   Last Titration: 11/01/18 18:52 Dose:  1,100 unit/hr, 22 mls/hr


Insulin Aspart (Novolog Vial Sliding Scale -)  1 vial SQ ACHS [facility]; Protocol


   Last Admin: 11/02/18 00:16 Dose:  2 unit


Insulin Detemir (Levemir Vial)  15 units SQ 0700,2200 [facility]


   Last Admin: 11/02/18 00:16 Dose:  15 unit


Montelukast Sodium (Singulair -)  10 mg PO HS [facility]


   Last Admin: 11/02/18 00:25 Dose:  10 mg


Multivitamins (Total B With C -)  1 each PO DAILY [facility]


   Last Admin: 11/01/18 10:21 Dose:  1 each


Non-Formulary Medication (Docosahexanoic Acid/Epa [Fish Oil Softgel])  1 each 

PO DAILY [facility]


Non-Formulary Medication (Meloxicam)  15 mg PO DAILY [facility]


Ondansetron HCl (Zofran Injection)  4 mg IVPUSH Q6H PRN


   PRN Reason: NAUSEA


Tramadol HCl (Ultram -)  50 mg PO Q6H PRN


   PRN Reason: PAIN 4-6


   Last Admin: 11/02/18 00:45 Dose:  50 mg











- Objective


Vital Signs: 


 Vital Signs











Temperature  98.1 F   11/02/18 04:51


 


Pulse Rate  91 H  11/02/18 04:51


 


Respiratory Rate  20   11/02/18 04:51


 


Blood Pressure  144/54 L  11/02/18 04:51


 


O2 Sat by Pulse Oximetry (%)  98   11/02/18 04:51














Elderly F not in distress  c/o body pain





HEENT:  MM moist , anemia, PERRLA EOMI





NECK: No JVd No Bruit





CHEST: Minimal basal crepts 





CVS: S1S2 R no m/g/r





ABD:  Obese non tender BS +, Last BM yesterday





EXT  & BACK: Extensive edema, foul smelling ulcers  Left > Rt Venous stasuis 

chnages





CNS: AOX3 Non focal 





DERM: B/L infected wounds 











Labs: 


 CBC, BMP





 11/02/18 06:25 





 11/02/18 06:25 





 INR, PTT











INR  1.14  (0.83-1.09)  H  10/31/18  14:24    














Problem List





- Problems


(1) Cellulitis


Assessment/Plan: 


Extensive B/L LE cellulitis present with elevated TWBC  Lactic acid and fever 

suggestive of Sepsis grew poly microbial on Zosyn and Vancomycin as per ID 

recommendation 


Code(s): L03.90 - CELLULITIS, UNSPECIFIED   


Qualifiers: 


   Site of cellulitis: extremity   Site of cellulitis of extremity: lower 

extremity   Laterality: left   Qualified Code(s): L03.116 - Cellulitis of left 

lower limb   





(2) Sepsis


Assessment/Plan: 


 Improving Extensive B/L LE cellulitis present with elevated TWBC now trending 

down   Lactic acid and fever suggestive of Sepsis grew poly microbial on Zosyn 

and Vancomycin as per ID recommendation


Code(s): A41.9 - SEPSIS, UNSPECIFIED ORGANISM   





(3) NSTEMI (non-ST elevated myocardial infarction)


Assessment/Plan: 


Elevated Troponin I multiple CAD risk factors most likely Demand Ischemia cont 

current regimen on Heparin infusion  denies any chest pain  F/U cardiology 

recommendations


Code(s): I21.4 - NON-ST ELEVATION (NSTEMI) MYOCARDIAL INFARCTION   





(4) Diabetes


Assessment/Plan: 


T2 with Microangiopathy   CKD on Levimir and correction dose Lispro and oral 

meds will F/U HBa!C optimize Glycemic control.


Code(s): E11.9 - TYPE 2 DIABETES MELLITUS WITHOUT COMPLICATIONS   


Qualifiers: 


   Diabetes mellitus type: type 2   Diabetes mellitus long term insulin use: 

with long term use   Diabetes mellitus complication status: with skin 

complications   Diabetes mellitus complication detail: with other skin ulcer   

Qualified Code(s): E11.622 - Type 2 diabetes mellitus with other skin ulcer; 

Z79.4 - Long term (current) use of insulin   





(5) Anemia


Assessment/Plan: 


Chrnic H/H is table


Code(s): D64.9 - ANEMIA, UNSPECIFIED   





(6) HTN (hypertension)


Assessment/Plan: 


 Now Well controlled cont all home meds


Code(s): I10 - ESSENTIAL (PRIMARY) HYPERTENSION   





(7) Morbid obesity


Assessment/Plan: 


Morbidly obese BMI 53 will F/U nutritional  assessment as out patient on DC


Code(s): E66.01 - MORBID (SEVERE) OBESITY DUE TO EXCESS CALORIES   





(8) Chronic pain


Code(s): G89.29 - OTHER CHRONIC PAIN   


Qualifiers: 


   Chronic pain type: chronic pain syndrome   Qualified Code(s): G89.4 - 

Chronic pain syndrome   





(9) COPD (chronic obstructive pulmonary disease)


Assessment/Plan: 


On symbicort cont same at present stable


Code(s): J44.9 - CHRONIC OBSTRUCTIVE PULMONARY DISEASE, UNSPECIFIED   





(10) Chronic pain


Assessment/Plan: 


Gabapentin and Tramadol


Code(s): G89.29 - OTHER CHRONIC PAIN

## 2018-11-02 NOTE — PN
Progress Note (short form)





- Note


Progress Note: 





Complains of bilateral leg pains at sites of ulcers


Still febrile


WBC improved


Blood c/s prelim grp B strep


Urine c/s  Enterobacter





Awake in bed


Morbidly obese


Cor S1S2


Lungs clear


Abdomen obese, soft, non tender


+ bilateral LE ulcers, malodorous drainage








Bacteremia/ sepsis, likely secondary to skin source


UTI


Morbid obesity


Pending final culture results, empiric  ceftriaxone

## 2018-11-03 LAB
ALBUMIN SERPL-MCNC: 2 G/DL (ref 3.4–5)
ALP SERPL-CCNC: 60 U/L (ref 45–117)
ALT SERPL-CCNC: 14 U/L (ref 13–61)
ANION GAP SERPL CALC-SCNC: 8 MMOL/L (ref 8–16)
AST SERPL-CCNC: 17 U/L (ref 15–37)
BASOPHILS # BLD: 0.5 % (ref 0–2)
BILIRUB SERPL-MCNC: 0.5 MG/DL (ref 0.2–1)
BUN SERPL-MCNC: 21 MG/DL (ref 7–18)
CALCIUM SERPL-MCNC: 7.7 MG/DL (ref 8.5–10.1)
CHLORIDE SERPL-SCNC: 103 MMOL/L (ref 98–107)
CO2 SERPL-SCNC: 25 MMOL/L (ref 21–32)
CREAT SERPL-MCNC: 1 MG/DL (ref 0.55–1.3)
DEPRECATED RDW RBC AUTO: 15.7 % (ref 11.6–15.6)
EOSINOPHIL # BLD: 1.9 % (ref 0–4.5)
GLUCOSE SERPL-MCNC: 128 MG/DL (ref 74–106)
HCT VFR BLD CALC: 26.2 % (ref 32.4–45.2)
HGB BLD-MCNC: 8.8 GM/DL (ref 10.7–15.3)
LYMPHOCYTES # BLD: 13 % (ref 8–40)
MCH RBC QN AUTO: 26.9 PG (ref 25.7–33.7)
MCHC RBC AUTO-ENTMCNC: 33.7 G/DL (ref 32–36)
MCV RBC: 79.9 FL (ref 80–96)
MONOCYTES # BLD AUTO: 9.3 % (ref 3.8–10.2)
NEUTROPHILS # BLD: 75.3 % (ref 42.8–82.8)
PLATELET # BLD AUTO: 320 K/MM3 (ref 134–434)
PMV BLD: 7.9 FL (ref 7.5–11.1)
POTASSIUM SERPLBLD-SCNC: 4.3 MMOL/L (ref 3.5–5.1)
PROT SERPL-MCNC: 6.8 G/DL (ref 6.4–8.2)
RBC # BLD AUTO: 3.28 M/MM3 (ref 3.6–5.2)
SODIUM SERPL-SCNC: 136 MMOL/L (ref 136–145)
WBC # BLD AUTO: 10.1 K/MM3 (ref 4–10)

## 2018-11-03 RX ADMIN — GABAPENTIN SCH MG: 100 CAPSULE ORAL at 22:09

## 2018-11-03 RX ADMIN — Medication SCH MG: at 09:38

## 2018-11-03 RX ADMIN — INSULIN DETEMIR SCH: 100 INJECTION, SOLUTION SUBCUTANEOUS at 06:43

## 2018-11-03 RX ADMIN — INSULIN DETEMIR SCH: 100 INJECTION, SOLUTION SUBCUTANEOUS at 22:04

## 2018-11-03 RX ADMIN — GABAPENTIN SCH MG: 100 CAPSULE ORAL at 14:14

## 2018-11-03 RX ADMIN — MONTELUKAST SODIUM SCH MG: 10 TABLET, COATED ORAL at 22:09

## 2018-11-03 RX ADMIN — SODIUM CHLORIDE SCH MLS/HR: 9 INJECTION, SOLUTION INTRAVENOUS at 09:39

## 2018-11-03 RX ADMIN — ATORVASTATIN CALCIUM SCH MG: 40 TABLET, FILM COATED ORAL at 22:10

## 2018-11-03 RX ADMIN — CYCLOBENZAPRINE HYDROCHLORIDE SCH MG: 10 TABLET, FILM COATED ORAL at 22:09

## 2018-11-03 RX ADMIN — GLYBURIDE SCH MG: 5 TABLET ORAL at 16:38

## 2018-11-03 RX ADMIN — INSULIN ASPART SCH: 100 INJECTION, SOLUTION INTRAVENOUS; SUBCUTANEOUS at 22:05

## 2018-11-03 RX ADMIN — AMLODIPINE BESYLATE SCH MG: 5 TABLET ORAL at 09:10

## 2018-11-03 RX ADMIN — LOSARTAN POTASSIUM AND HYDROCHLOROTHIAZIDE TABLETS SCH TAB: 50; 12.5 TABLET, FILM COATED ORAL at 09:38

## 2018-11-03 RX ADMIN — VITAMIN C SCH: TAB at 09:39

## 2018-11-03 RX ADMIN — CEFTRIAXONE SODIUM SCH MLS/HR: 2 INJECTION, POWDER, FOR SOLUTION INTRAMUSCULAR; INTRAVENOUS at 09:11

## 2018-11-03 RX ADMIN — AMLODIPINE BESYLATE SCH MG: 5 TABLET ORAL at 22:09

## 2018-11-03 RX ADMIN — ACETAMINOPHEN PRN MG: 325 TABLET ORAL at 09:10

## 2018-11-03 RX ADMIN — SODIUM CHLORIDE SCH: 9 INJECTION, SOLUTION INTRAVENOUS at 16:35

## 2018-11-03 RX ADMIN — INSULIN ASPART SCH: 100 INJECTION, SOLUTION INTRAVENOUS; SUBCUTANEOUS at 16:53

## 2018-11-03 RX ADMIN — HEPARIN SODIUM PRN UNIT: 5000 INJECTION, SOLUTION INTRAVENOUS; SUBCUTANEOUS at 09:14

## 2018-11-03 RX ADMIN — ASPIRIN 81 MG SCH MG: 81 TABLET ORAL at 09:10

## 2018-11-03 RX ADMIN — BUDESONIDE AND FORMOTEROL FUMARATE DIHYDRATE SCH PUFF: 160; 4.5 AEROSOL RESPIRATORY (INHALATION) at 09:38

## 2018-11-03 RX ADMIN — GLYBURIDE SCH MG: 5 TABLET ORAL at 06:46

## 2018-11-03 RX ADMIN — COLLAGENASE SANTYL SCH: 250 OINTMENT TOPICAL at 12:24

## 2018-11-03 RX ADMIN — INSULIN ASPART SCH UNIT: 100 INJECTION, SOLUTION INTRAVENOUS; SUBCUTANEOUS at 06:43

## 2018-11-03 RX ADMIN — GABAPENTIN SCH MG: 100 CAPSULE ORAL at 06:44

## 2018-11-03 RX ADMIN — SILVER SULFADIAZINE SCH: 10 CREAM TOPICAL at 14:14

## 2018-11-03 RX ADMIN — VITAMIN D, TAB 1000IU (100/BT) SCH UNIT: 25 TAB at 09:10

## 2018-11-03 RX ADMIN — INSULIN ASPART SCH: 100 INJECTION, SOLUTION INTRAVENOUS; SUBCUTANEOUS at 12:09

## 2018-11-03 RX ADMIN — BUDESONIDE AND FORMOTEROL FUMARATE DIHYDRATE SCH PUFF: 160; 4.5 AEROSOL RESPIRATORY (INHALATION) at 22:10

## 2018-11-03 NOTE — PN
Progress Note, Physician


Chief Complaint: 


 No new complaints , C/O Left leg pain  





- Current Medication List


Current Medications: 


Active Medications





Acetaminophen (Tylenol -)  650 mg PO Q6H PRN


   PRN Reason: FEVER


   Last Admin: 11/02/18 00:45 Dose:  650 mg


Amlodipine Besylate (Norvasc -)  5 mg PO BID [facility]


   Last Admin: 11/02/18 21:47 Dose:  5 mg


Ascorbic Acid (Vitamin C -)  250 mg PO DAILY [facility]


   Last Admin: 11/02/18 10:57 Dose:  250 mg


Aspirin (Asa -)  81 mg PO DAILY [facility]


   Last Admin: 11/02/18 10:56 Dose:  81 mg


Atorvastatin Calcium (Lipitor -)  40 mg PO HS [facility]


   Last Admin: 11/02/18 21:47 Dose:  40 mg


Budesonide/Formoterol Fumarate (Symbicort 160/4.5mcg -)  2 puff IH BID [facility]


   Last Admin: 11/02/18 23:01 Dose:  2 puff


Cholecalciferol (Vitamin D3 -)  1,000 unit PO DAILY [facility]


   Last Admin: 11/02/18 10:57 Dose:  1,000 unit


Clonidine (Catapres -)  0.1 mg PO HS [facility]


   Last Admin: 11/02/18 21:47 Dose:  0.1 mg


Cyclobenzaprine HCl (Flexeril -)  10 mg PO HS [facility]


   Last Admin: 11/02/18 21:47 Dose:  10 mg


Gabapentin (Neurontin -)  100 mg PO TID [facility]


   Last Admin: 11/03/18 06:44 Dose:  100 mg


Glyburide (Diabeta -)  5 mg PO BIDAC [facility]


   Last Admin: 11/03/18 06:46 Dose:  5 mg


HCTZ/Losartan Potassium (Hyzaar -)  2 tab PO DAILY [facility]


   Last Admin: 11/02/18 12:06 Dose:  2 tab


Heparin Sodium (Porcine) (Heparin -)  1,000 unit IVPUSH PRN PRN


   PRN Reason: Heparin


   Last Admin: 11/02/18 23:00 Dose:  1,000 unit


Heparin Sodium (Porcine) (Heparin -)  5,000 unit IVPUSH PRN PRN


   PRN Reason: Heparin


   Last Admin: 11/02/18 09:31 Dose:  5,000 unit


Heparin Sodium (Porcine) 25, (000 unit/ Sodium Chloride)  500 mls @ 20 mls/hr 

IV TITR [facility]; Protocol


   Last Titration: 11/03/18 00:12 Dose:  1,350 unit/hr, 27 mls/hr


Ceftriaxone Sodium 2 gm/ (Dextrose)  100 mls @ 100 mls/hr IVPB DAILY [facility]; 

Protocol


   Last Admin: 11/02/18 21:40 Dose:  100 mls/hr


Influenza Virus Vaccine Quadrival (Flulaval Quad [id number])  60 mcg IM .ONCE ONE


   Stop: 11/03/18 10:01


Insulin Aspart (Novolog Vial Sliding Scale -)  1 vial SQ ACHS [facility]; Protocol


   Last Admin: 11/03/18 06:43 Dose:  2 unit


Insulin Detemir (Levemir Vial)  15 units SQ 0700,2200 [facility]


   Last Admin: 11/03/18 06:43 Dose:  Not Given


Montelukast Sodium (Singulair -)  10 mg PO HS [facility]


   Last Admin: 11/02/18 21:48 Dose:  10 mg


Multivitamins (Total B With C -)  1 each PO DAILY [facility]


   Last Admin: 11/02/18 10:57 Dose:  1 each


Non-Formulary Medication (Docosahexanoic Acid/Epa [Fish Oil Softgel])  1 each 

PO DAILY [facility]


Non-Formulary Medication (Meloxicam)  15 mg PO DAILY [facility]


Ondansetron HCl (Zofran Injection)  4 mg IVPUSH Q6H PRN


   PRN Reason: NAUSEA


Tramadol HCl (Ultram -)  50 mg PO Q6H PRN


   PRN Reason: PAIN 4-6


   Last Admin: 11/02/18 22:00 Dose:  50 mg











- Objective


Vital Signs: 


 Vital Signs











Temperature  98.6 F   11/03/18 06:00


 


Pulse Rate  82   11/03/18 06:00


 


Respiratory Rate  18   11/03/18 06:00


 


Blood Pressure  137/66   11/03/18 06:00


 


O2 Sat by Pulse Oximetry (%)  96   11/02/18 21:00








Elderly F not in distress  c/o body pain





HEENT:  MM moist , anemia, PERRLA EOMI





NECK: No JVd No Bruit





CHEST: Minimal basal crepts 





CVS: S1S2 R no m/g/r





ABD:  Obese non tender BS +, Last BM yesterday





EXT  & BACK: Extensive edema, foul smelling ulcers  Left > Rt Venous stasuis 

chnages





CNS: AOX3 Non focal 





DERM: B/L infected wounds 





Labs: 


 CBC, BMP





 11/03/18 05:30 





 11/03/18 05:30 





 INR, PTT











INR  1.14  (0.83-1.09)  H  10/31/18  14:24    














Problem List





- Problems


(1) Cellulitis


Assessment/Plan: 


Extensive B/L LE cellulitis present with elevated TWBC  Lactic acid and fever 

suggestive of Sepsis grew poly microbial on Zosyn and Vancomycin as per ID 

recommendation 


Code(s): L03.90 - CELLULITIS, UNSPECIFIED   


Qualifiers: 


   Site of cellulitis: extremity   Site of cellulitis of extremity: lower 

extremity   Laterality: left   Qualified Code(s): L03.116 - Cellulitis of left 

lower limb   





(2) Sepsis


Assessment/Plan: 


 Improving Extensive B/L LE cellulitis present with elevated TWBC now trending 

down   Lactic acid and fever suggestive of Sepsis grew poly microbial on Zosyn 

and Vancomycin as per ID recommendation


Code(s): A41.9 - SEPSIS, UNSPECIFIED ORGANISM   





(3) NSTEMI (non-ST elevated myocardial infarction)


Assessment/Plan: 


Elevated Troponin I multiple CAD risk factors most likely Demand Ischemia cont 

current regimen on Heparin infusion  denies any chest pain  F/U cardiology 

recommendations


Code(s): I21.4 - NON-ST ELEVATION (NSTEMI) MYOCARDIAL INFARCTION   





(4) Diabetes


Assessment/Plan: 


T2 with Microangiopathy   CKD on Levimir and correction dose Lispro and oral 

meds will F/U HBa!C optimize Glycemic control.


Code(s): E11.9 - TYPE 2 DIABETES MELLITUS WITHOUT COMPLICATIONS   


Qualifiers: 


   Diabetes mellitus type: type 2   Diabetes mellitus long term insulin use: 

with long term use   Diabetes mellitus complication status: with skin 

complications   Diabetes mellitus complication detail: with other skin ulcer   

Qualified Code(s): E11.622 - Type 2 diabetes mellitus with other skin ulcer; 

Z79.4 - Long term (current) use of insulin   





(5) Anemia


Assessment/Plan: 


Chrnic H/H is table


Code(s): D64.9 - ANEMIA, UNSPECIFIED   





(6) HTN (hypertension)


Assessment/Plan: 


 Now Well controlled cont all home meds


Code(s): I10 - ESSENTIAL (PRIMARY) HYPERTENSION   





(7) Morbid obesity


Assessment/Plan: 


Morbidly obese BMI 53 will F/U nutritional  assessment as out patient on DC


Code(s): E66.01 - MORBID (SEVERE) OBESITY DUE TO EXCESS CALORIES   





(8) Chronic pain


Code(s): G89.29 - OTHER CHRONIC PAIN   


Qualifiers: 


   Chronic pain type: chronic pain syndrome   Qualified Code(s): G89.4 - 

Chronic pain syndrome   





(9) COPD (chronic obstructive pulmonary disease)


Assessment/Plan: 


On symbicort cont same at present stable


Code(s): J44.9 - CHRONIC OBSTRUCTIVE PULMONARY DISEASE, UNSPECIFIED   





(10) Chronic pain


Assessment/Plan: 


Gabapentin and Tramadol


Code(s): G89.29 - OTHER CHRONIC PAIN

## 2018-11-03 NOTE — PN
Progress Note (short form)





- Note


Progress Note: 








Progress Note: 


s: no cp sob palps dizzy





o:


  Vital Signs











 Period  Temp  Pulse  Resp  BP Sys/Guevara  Pulse Ox


 


 Last 24 Hr  98.2 F-99.7 F  80-92  16-20  137-195/63-66  96-98











nad no jvd


rrr s1s2 no mrg


cta bl nl eff


aaox3


trace le edema, nonpitting edema, chronic stasis changes of skin


abd nt nd pos bs


no jaundice diaphoresis


 Current Medications





Acetaminophen (Tylenol -)  650 mg PO Q6H PRN


   PRN Reason: FEVER


   Last Admin: 11/03/18 09:10 Dose:  650 mg


Amlodipine Besylate (Norvasc -)  5 mg PO BID [facility]


   Last Admin: 11/03/18 09:10 Dose:  5 mg


Ascorbic Acid (Vitamin C -)  250 mg PO DAILY [facility]


   Last Admin: 11/03/18 09:38 Dose:  250 mg


Aspirin (Asa -)  81 mg PO DAILY [facility]


   Last Admin: 11/03/18 09:10 Dose:  81 mg


Atorvastatin Calcium (Lipitor -)  40 mg PO HS [facility]


   Last Admin: 11/02/18 21:47 Dose:  40 mg


Budesonide/Formoterol Fumarate (Symbicort 160/4.5mcg -)  2 puff IH BID [facility]


   Last Admin: 11/03/18 09:38 Dose:  2 puff


Cholecalciferol (Vitamin D3 -)  1,000 unit PO DAILY [facility]


   Last Admin: 11/03/18 09:10 Dose:  1,000 unit


Clonidine (Catapres -)  0.1 mg PO HS [facility]


   Last Admin: 11/02/18 21:47 Dose:  0.1 mg


Cyclobenzaprine HCl (Flexeril -)  10 mg PO HS [facility]


   Last Admin: 11/02/18 21:47 Dose:  10 mg


Gabapentin (Neurontin -)  100 mg PO TID [facility]


   Last Admin: 11/03/18 06:44 Dose:  100 mg


Glyburide (Diabeta -)  5 mg PO BIDAC [facility]


   Last Admin: 11/03/18 06:46 Dose:  5 mg


HCTZ/Losartan Potassium (Hyzaar -)  2 tab PO DAILY [facility]


   Last Admin: 11/03/18 09:38 Dose:  2 tab


Heparin Sodium (Porcine) (Heparin -)  1,000 unit IVPUSH PRN PRN


   PRN Reason: Heparin


   Last Admin: 11/02/18 23:00 Dose:  1,000 unit


Heparin Sodium (Porcine) (Heparin -)  5,000 unit IVPUSH PRN PRN


   PRN Reason: Heparin


   Last Admin: 11/03/18 09:14 Dose:  5,000 unit


Heparin Sodium (Porcine) 25, (000 unit/ Sodium Chloride)  500 mls @ 20 mls/hr 

IV TITR [facility]; Protocol


   Last Admin: 11/03/18 09:39 Dose:  1,450 unit/hr, 29 mls/hr


Ceftriaxone Sodium 2 gm/ (Dextrose)  100 mls @ 100 mls/hr IVPB DAILY [facility]; 

Protocol


   Last Admin: 11/03/18 09:11 Dose:  100 mls/hr


Insulin Aspart (Novolog Vial Sliding Scale -)  1 vial SQ ACHS [facility]; Protocol


   Last Admin: 11/03/18 06:43 Dose:  2 unit


Insulin Detemir (Levemir Vial)  15 units SQ 0700,2200 [facility]


   Last Admin: 11/03/18 06:43 Dose:  Not Given


Montelukast Sodium (Singulair -)  10 mg PO HS [facility]


   Last Admin: 11/02/18 21:48 Dose:  10 mg


Multivitamins (Total B With C -)  1 each PO DAILY [facility]


   Last Admin: 11/03/18 09:39 Dose:  Not Given


Non-Formulary Medication (Docosahexanoic Acid/Epa [Fish Oil Softgel])  1 each 

PO DAILY [facility]


Non-Formulary Medication (Meloxicam)  15 mg PO DAILY [facility]


Ondansetron HCl (Zofran Injection)  4 mg IVPUSH Q6H PRN


   PRN Reason: NAUSEA


Tramadol HCl (Ultram -)  50 mg PO Q6H PRN


   PRN Reason: PAIN 4-6


   Last Admin: 11/03/18 09:10 Dose:  50 mg











cxr: clear lungs





ecg:  sr, nl intervals, no ischemic changes





echo 10/2018:  mild lvh, nl lv/rv, no sig valve path











a/p:  68 f hx morbid obesity, venous insuff/edema, chronic leg wounds, dm, htn, 

anemia, here with fever.





sepsis, cellulitis:


-abx per PMD, ID





venous insuff/le edema:


-on lasix at home, held here due to santi likely from sepsis, Cr stable now. cont 

holding





htn:


-cont home meds





anemia:


-pt has baseline anemia but hgb dropped into 7's 11/1, no obvious bleeding, may 

just be dilutional from ivfs she got.  got prbcs, hgb 8s today.





NSTEMI:


-trop was normal then repeat increased to 1.6 (now trending down).  Possibly 

just related to sepsis and santi but she did mention chest pressure and the jump 

from normal to 1.6 may be consistent with ACS.  ECG and echo unremarkable.  

Already getting asa.  cont hep gtt for 72 hours for nstemi.   hold off on 

plavix for now given her anemia requiring prbcs.  Monitor hgb.  Tele.  Trend 

trops.  Started lipitor.


-ischemic eval after acute issues resolve

## 2018-11-03 NOTE — PN
Progress Note (short form)





- Note


Progress Note: 





feels better today





dressings intact








no complaints





chronic constipation





 Vital Signs











 Period  Temp  Pulse  Resp  BP Sys/Guevara  Pulse Ox


 


 Last 24 Hr  98.2 F-99.7 F  80-92  16-18  137-195/63-66  96-98








cor-rrr


lungs clear


abd soft,nt


ext dressings intact





 CBC, BMP





 11/03/18 05:30 





 11/03/18 05:30 





 Microbiology





10/31/18 14:50   Urine - Urine Clean Catch   Urine Culture - Final


                            Enterobacter Cloacae


10/31/18 14:24   Blood - Peripheral Venous   Blood Culture - Preliminary


                            Strep Agalactiae Group B


10/31/18 14:24   Blood - Peripheral Venous   Blood Culture - Preliminary


                            Beta Hemolytic Strep


10/31/18 14:35   Nasopharyngeal Swab   Influenza Types A,B Antigen - Final


10/31/18 14:35   Nasopharyngeal Swab    - Final





echo grossly normal technically limited





 Laboratory Tests











  11/01/18 11/01/18





  06:15 06:15


 


ESR   118 H


 


C-Reactive Protein  16.6 H 








a/p


group B strep bacteremia


infected leg ulcers


enterobacter UTI


continue ceftriaxone


repeat blood cultures


wound care per surgery- she has been going to [facility] as well as outpt





have asked nurse to call me so I can see her legs when dressing change is done

## 2018-11-04 LAB
ACANTHOCYTES BLD QL SMEAR: 0
ANION GAP SERPL CALC-SCNC: 11 MMOL/L (ref 8–16)
ANISOCYTOSIS BLD QL: 0
BASO STIPL BLD QL SMEAR: 0
BASOPHILS # BLD: 0.7 % (ref 0–2)
BUN SERPL-MCNC: 21 MG/DL (ref 7–18)
CALCIUM SERPL-MCNC: 7.8 MG/DL (ref 8.5–10.1)
CHLORIDE SERPL-SCNC: 103 MMOL/L (ref 98–107)
CO2 SERPL-SCNC: 23 MMOL/L (ref 21–32)
CREAT SERPL-MCNC: 1.1 MG/DL (ref 0.55–1.3)
DACRYOCYTES BLD QL SMEAR: 0
DEPRECATED RDW RBC AUTO: 15.6 % (ref 11.6–15.6)
DOHLE BOD BLD QL SMEAR: 0
EOSINOPHIL # BLD: 1.8 % (ref 0–4.5)
GLUCOSE SERPL-MCNC: 99 MG/DL (ref 74–106)
HCT VFR BLD CALC: 27.4 % (ref 32.4–45.2)
HELMET CELLS BLD QL SMEAR: 0
HGB BLD-MCNC: 9.3 GM/DL (ref 10.7–15.3)
HOWELL-JOLLY BOD BLD QL SMEAR: 0
LYMPHOCYTES # BLD: 13.4 % (ref 8–40)
MACROCYTES BLD QL: 0
MCH RBC QN AUTO: 27.2 PG (ref 25.7–33.7)
MCHC RBC AUTO-ENTMCNC: 33.8 G/DL (ref 32–36)
MCV RBC: 80.5 FL (ref 80–96)
MONOCYTES # BLD AUTO: 11.5 % (ref 3.8–10.2)
NEUTROPHILS # BLD: 72.6 % (ref 42.8–82.8)
OVALOCYTES BLD QL SMEAR: 0
PLATELET # BLD AUTO: 340 K/MM3 (ref 134–434)
PLATELET BLD QL SMEAR: NORMAL
PMV BLD: 7.8 FL (ref 7.5–11.1)
POTASSIUM SERPLBLD-SCNC: 4.1 MMOL/L (ref 3.5–5.1)
RBC # BLD AUTO: 3.41 M/MM3 (ref 3.6–5.2)
ROULEAUX BLD QL SMEAR: 0
SICKLE CELLS BLD QL SMEAR: 0
SODIUM SERPL-SCNC: 136 MMOL/L (ref 136–145)
TARGETS BLD QL SMEAR: 0
TOXIC GRANULES BLD QL SMEAR: 0
WBC # BLD AUTO: 8.7 K/MM3 (ref 4–10)

## 2018-11-04 RX ADMIN — INSULIN ASPART SCH UNIT: 100 INJECTION, SOLUTION INTRAVENOUS; SUBCUTANEOUS at 17:37

## 2018-11-04 RX ADMIN — SILVER SULFADIAZINE SCH: 10 CREAM TOPICAL at 14:32

## 2018-11-04 RX ADMIN — LOSARTAN POTASSIUM AND HYDROCHLOROTHIAZIDE TABLETS SCH TAB: 50; 12.5 TABLET, FILM COATED ORAL at 12:24

## 2018-11-04 RX ADMIN — GABAPENTIN SCH MG: 100 CAPSULE ORAL at 14:32

## 2018-11-04 RX ADMIN — INSULIN ASPART SCH: 100 INJECTION, SOLUTION INTRAVENOUS; SUBCUTANEOUS at 06:23

## 2018-11-04 RX ADMIN — INSULIN ASPART SCH UNIT: 100 INJECTION, SOLUTION INTRAVENOUS; SUBCUTANEOUS at 21:47

## 2018-11-04 RX ADMIN — ACETAMINOPHEN PRN MG: 325 TABLET ORAL at 01:07

## 2018-11-04 RX ADMIN — ATORVASTATIN CALCIUM SCH MG: 40 TABLET, FILM COATED ORAL at 21:49

## 2018-11-04 RX ADMIN — INSULIN ASPART SCH UNIT: 100 INJECTION, SOLUTION INTRAVENOUS; SUBCUTANEOUS at 17:38

## 2018-11-04 RX ADMIN — ACETAMINOPHEN PRN MG: 325 TABLET ORAL at 21:55

## 2018-11-04 RX ADMIN — ASPIRIN 81 MG SCH MG: 81 TABLET ORAL at 10:12

## 2018-11-04 RX ADMIN — AMLODIPINE BESYLATE SCH MG: 5 TABLET ORAL at 10:12

## 2018-11-04 RX ADMIN — BUDESONIDE AND FORMOTEROL FUMARATE DIHYDRATE SCH PUFF: 160; 4.5 AEROSOL RESPIRATORY (INHALATION) at 10:30

## 2018-11-04 RX ADMIN — ACETAMINOPHEN PRN MG: 325 TABLET ORAL at 12:23

## 2018-11-04 RX ADMIN — INSULIN DETEMIR SCH: 100 INJECTION, SOLUTION SUBCUTANEOUS at 06:21

## 2018-11-04 RX ADMIN — VITAMIN D, TAB 1000IU (100/BT) SCH UNIT: 25 TAB at 10:12

## 2018-11-04 RX ADMIN — AMLODIPINE BESYLATE SCH MG: 5 TABLET ORAL at 21:49

## 2018-11-04 RX ADMIN — GABAPENTIN SCH MG: 100 CAPSULE ORAL at 05:42

## 2018-11-04 RX ADMIN — VITAMIN C SCH EACH: TAB at 12:24

## 2018-11-04 RX ADMIN — INSULIN DETEMIR SCH UNIT: 100 INJECTION, SOLUTION SUBCUTANEOUS at 21:47

## 2018-11-04 RX ADMIN — CEFTRIAXONE SODIUM SCH MLS/HR: 2 INJECTION, POWDER, FOR SOLUTION INTRAMUSCULAR; INTRAVENOUS at 12:25

## 2018-11-04 RX ADMIN — Medication SCH MG: at 12:25

## 2018-11-04 RX ADMIN — COLLAGENASE SANTYL SCH: 250 OINTMENT TOPICAL at 14:32

## 2018-11-04 RX ADMIN — HEPARIN SODIUM PRN UNIT: 5000 INJECTION, SOLUTION INTRAVENOUS; SUBCUTANEOUS at 10:12

## 2018-11-04 RX ADMIN — CYCLOBENZAPRINE HYDROCHLORIDE SCH MG: 10 TABLET, FILM COATED ORAL at 21:50

## 2018-11-04 RX ADMIN — BUDESONIDE AND FORMOTEROL FUMARATE DIHYDRATE SCH PUFF: 160; 4.5 AEROSOL RESPIRATORY (INHALATION) at 21:51

## 2018-11-04 RX ADMIN — GABAPENTIN SCH MG: 100 CAPSULE ORAL at 21:51

## 2018-11-04 RX ADMIN — INSULIN ASPART SCH: 100 INJECTION, SOLUTION INTRAVENOUS; SUBCUTANEOUS at 12:23

## 2018-11-04 RX ADMIN — GLYBURIDE SCH MG: 5 TABLET ORAL at 17:35

## 2018-11-04 RX ADMIN — MONTELUKAST SODIUM SCH MG: 10 TABLET, COATED ORAL at 21:50

## 2018-11-04 NOTE — PN
Progress Note, Physician


Chief Complaint: 


 No new complaints , C/O Left leg pain  





- Current Medication List


Current Medications: 


Active Medications





Acetaminophen (Tylenol -)  650 mg PO Q6H PRN


   PRN Reason: FEVER


   Last Admin: 11/04/18 01:07 EST Dose:  650 mg


Amlodipine Besylate (Norvasc -)  5 mg PO BID [facility]


   Last Admin: 11/03/18 22:09 Dose:  5 mg


Ascorbic Acid (Vitamin C -)  250 mg PO DAILY [facility]


   Last Admin: 11/03/18 09:38 Dose:  250 mg


Aspirin (Asa -)  81 mg PO DAILY [facility]


   Last Admin: 11/03/18 09:10 Dose:  81 mg


Atorvastatin Calcium (Lipitor -)  40 mg PO HS [facility]


   Last Admin: 11/03/18 22:10 Dose:  40 mg


Budesonide/Formoterol Fumarate (Symbicort 160/4.5mcg -)  2 puff IH BID [facility]


   Last Admin: 11/03/18 22:10 Dose:  2 puff


Cholecalciferol (Vitamin D3 -)  1,000 unit PO DAILY [facility]


   Last Admin: 11/03/18 09:10 Dose:  1,000 unit


Clonidine (Catapres -)  0.1 mg PO HS [facility]


   Last Admin: 11/03/18 22:09 Dose:  0.1 mg


Collagenase (Santyl -)  1 applic TP DAILY [facility]; Protocol


   Last Admin: 11/03/18 12:24 Dose:  Not Given


Cyclobenzaprine HCl (Flexeril -)  10 mg PO HS [facility]


   Last Admin: 11/03/18 22:09 Dose:  10 mg


Gabapentin (Neurontin -)  100 mg PO TID [facility]


   Last Admin: 11/03/18 22:09 Dose:  100 mg


Glyburide (Diabeta -)  5 mg PO BIDAC [facility]


   Last Admin: 11/03/18 16:38 Dose:  5 mg


HCTZ/Losartan Potassium (Hyzaar -)  2 tab PO DAILY [facility]


   Last Admin: 11/03/18 09:38 Dose:  2 tab


Heparin Sodium (Porcine) (Heparin -)  1,000 unit IVPUSH PRN PRN


   PRN Reason: Heparin


   Last Admin: 11/02/18 23:00 Dose:  1,000 unit


Heparin Sodium (Porcine) (Heparin -)  5,000 unit IVPUSH PRN PRN


   PRN Reason: Heparin


   Last Admin: 11/03/18 09:14 Dose:  5,000 unit


Heparin Sodium (Porcine) 25, (000 unit/ Sodium Chloride)  500 mls @ 20 mls/hr 

IV TITR [facility]; Protocol


   Last Admin: 11/03/18 16:35 Dose:  Not Given


Ceftriaxone Sodium 2 gm/ (Dextrose)  100 mls @ 100 mls/hr IVPB DAILY [facility]; 

Protocol


   Last Admin: 11/03/18 09:11 Dose:  100 mls/hr


Insulin Aspart (Novolog Vial Sliding Scale -)  1 vial SQ ACHS [facility]; Protocol


   Last Admin: 11/04/18 06:23 Dose:  Not Given


Insulin Detemir (Levemir Vial)  15 units SQ 0700,2200 [facility]


   Last Admin: 11/04/18 06:21 Dose:  Not Given


Montelukast Sodium (Singulair -)  10 mg PO HS [facility]


   Last Admin: 11/03/18 22:09 Dose:  10 mg


Multivitamins (Total B With C -)  1 each PO DAILY [facility]


   Last Admin: 11/03/18 09:39 Dose:  Not Given


Non-Formulary Medication (Docosahexanoic Acid/Epa [Fish Oil Softgel])  1 each 

PO DAILY [facility]


Non-Formulary Medication (Meloxicam)  15 mg PO DAILY [facility]


Ondansetron HCl (Zofran Injection)  4 mg IVPUSH Q6H PRN


   PRN Reason: NAUSEA


Silver Sulfadiazine (Silvadene -)  1 applic TP DAILY [facility]


   Last Admin: 11/03/18 14:14 Dose:  Not Given


Tramadol HCl (Ultram -)  50 mg PO Q6H PRN


   PRN Reason: PAIN 4-6


   Last Admin: 11/03/18 22:08 Dose:  50 mg











- Objective


Vital Signs: 


 Vital Signs











Temperature  98.2 F   11/04/18 05:00


 


Pulse Rate  76   11/04/18 05:00


 


Respiratory Rate  20   11/04/18 05:00


 


Blood Pressure  139/61   11/04/18 05:00


 


O2 Sat by Pulse Oximetry (%)  97   11/03/18 21:00








Elderly F not in distress  c/o body pain





HEENT:  MM moist , anemia, PERRLA EOMI





NECK: No JVd No Bruit





CHEST: Minimal basal crepts 





CVS: S1S2 R no m/g/r





ABD:  Obese non tender BS +, Last BM yesterday





EXT  & BACK: Extensive edema, foul smelling ulcers  Left > Rt Venous stasuis 

chnages





CNS: AOX3 Non focal 





DERM: B/L infected wounds 





Labs: 


 CBC, BMP





 11/04/18 05:30 





 INR, PTT











INR  1.14  (0.83-1.09)  H  10/31/18  14:24    














Problem List





- Problems


(1) Cellulitis


Assessment/Plan: 


Extensive B/L LE cellulitis present with elevated TWBC  Lactic acid and fever 

suggestive of Sepsis grew poly microbial on Zosyn and Vancomycin as per ID 

recommendation 


Code(s): L03.90 - CELLULITIS, UNSPECIFIED   


Qualifiers: 


   Site of cellulitis: extremity   Site of cellulitis of extremity: lower 

extremity   Laterality: left   Qualified Code(s): L03.116 - Cellulitis of left 

lower limb   





(2) Sepsis


Assessment/Plan: 


 Improving Extensive B/L LE cellulitis present with elevated TWBC now trending 

down   Lactic acid and fever suggestive of Sepsis grew poly microbial on Zosyn 

and Vancomycin as per ID recommendation


Code(s): A41.9 - SEPSIS, UNSPECIFIED ORGANISM   





(3) NSTEMI (non-ST elevated myocardial infarction)


Assessment/Plan: 


Elevated Troponin I multiple CAD risk factors most likely Demand Ischemia cont 

current regimen on Heparin infusion  denies any chest pain  F/U cardiology 

recommendations


Code(s): I21.4 - NON-ST ELEVATION (NSTEMI) MYOCARDIAL INFARCTION   





(4) Diabetes


Assessment/Plan: 


T2 with Microangiopathy   CKD on Levimir and correction dose Lispro and oral 

meds will F/U HBa!C optimize Glycemic control.


Code(s): E11.9 - TYPE 2 DIABETES MELLITUS WITHOUT COMPLICATIONS   


Qualifiers: 


   Diabetes mellitus type: type 2   Diabetes mellitus long term insulin use: 

with long term use   Diabetes mellitus complication status: with skin 

complications   Diabetes mellitus complication detail: with other skin ulcer   

Qualified Code(s): E11.622 - Type 2 diabetes mellitus with other skin ulcer; 

Z79.4 - Long term (current) use of insulin   





(5) Anemia


Assessment/Plan: 


Chrnic H/H is table


Code(s): D64.9 - ANEMIA, UNSPECIFIED   





(6) HTN (hypertension)


Assessment/Plan: 


 Now Well controlled cont all home meds


Code(s): I10 - ESSENTIAL (PRIMARY) HYPERTENSION   





(7) Morbid obesity


Assessment/Plan: 


Morbidly obese BMI 53 will F/U nutritional  assessment as out patient on DC


Code(s): E66.01 - MORBID (SEVERE) OBESITY DUE TO EXCESS CALORIES   





(8) Chronic pain


Code(s): G89.29 - OTHER CHRONIC PAIN   


Qualifiers: 


   Chronic pain type: chronic pain syndrome   Qualified Code(s): G89.4 - 

Chronic pain syndrome   





(9) COPD (chronic obstructive pulmonary disease)


Assessment/Plan: 


On symbicort cont same at present stable


Code(s): J44.9 - CHRONIC OBSTRUCTIVE PULMONARY DISEASE, UNSPECIFIED   





(10) Chronic pain


Assessment/Plan: 


Gabapentin and Tramadol


Code(s): G89.29 - OTHER CHRONIC PAIN

## 2018-11-04 NOTE — PN
Progress Note (short form)





- Note


Progress Note: 








Progress Note: 


s: no cp sob palps dizzy





o:


  Vital Signs











 Period  Temp  Pulse  Resp  BP Sys/Guevara  Pulse Ox


 


 Last 24 Hr  98 F-98.9 F  76-88  20-20  131-180/54-80  97











nad no jvd


rrr s1s2 no mrg


cta bl nl eff


aaox3


trace le edema, nonpitting edema, chronic stasis changes of skin


abd nt nd pos bs


no jaundice diaphoresis


  Current Medications





Acetaminophen (Tylenol -)  650 mg PO Q6H PRN


   PRN Reason: FEVER


   Last Admin: 11/04/18 01:07 EST Dose:  650 mg


Amlodipine Besylate (Norvasc -)  5 mg PO BID [facility]


   Last Admin: 11/04/18 10:12 Dose:  5 mg


Ascorbic Acid (Vitamin C -)  250 mg PO DAILY [facility]


   Last Admin: 11/03/18 09:38 Dose:  250 mg


Aspirin (Asa -)  81 mg PO DAILY [facility]


   Last Admin: 11/04/18 10:12 Dose:  81 mg


Atorvastatin Calcium (Lipitor -)  40 mg PO HS [facility]


   Last Admin: 11/03/18 22:10 Dose:  40 mg


Budesonide/Formoterol Fumarate (Symbicort 160/4.5mcg -)  2 puff IH BID [facility]


   Last Admin: 11/03/18 22:10 Dose:  2 puff


Cholecalciferol (Vitamin D3 -)  1,000 unit PO DAILY [facility]


   Last Admin: 11/04/18 10:12 Dose:  1,000 unit


Clonidine (Catapres -)  0.1 mg PO HS [facility]


   Last Admin: 11/03/18 22:09 Dose:  0.1 mg


Collagenase (Santyl -)  1 applic TP DAILY [facility]; Protocol


   Last Admin: 11/03/18 12:24 Dose:  Not Given


Cyclobenzaprine HCl (Flexeril -)  10 mg PO HS [facility]


   Last Admin: 11/03/18 22:09 Dose:  10 mg


Gabapentin (Neurontin -)  100 mg PO TID [facility]


   Last Admin: 11/03/18 22:09 Dose:  100 mg


Glyburide (Diabeta -)  5 mg PO BIDAC [facility]


   Last Admin: 11/03/18 16:38 Dose:  5 mg


HCTZ/Losartan Potassium (Hyzaar -)  2 tab PO DAILY [facility]


   Last Admin: 11/03/18 09:38 Dose:  2 tab


Heparin Sodium (Porcine) (Heparin -)  1,000 unit IVPUSH PRN PRN


   PRN Reason: Heparin


   Last Admin: 11/04/18 10:12 Dose:  1,000 unit


Heparin Sodium (Porcine) (Heparin -)  5,000 unit IVPUSH PRN PRN


   PRN Reason: Heparin


   Last Admin: 11/03/18 09:14 Dose:  5,000 unit


Heparin Sodium (Porcine) 25, (000 unit/ Sodium Chloride)  500 mls @ 20 mls/hr 

IV TITR [facility]; Protocol


   Last Admin: 11/03/18 16:35 Dose:  Not Given


Ceftriaxone Sodium 2 gm/ (Dextrose)  100 mls @ 100 mls/hr IVPB DAILY [facility]; 

Protocol


   Last Admin: 11/03/18 09:11 Dose:  100 mls/hr


Insulin Aspart (Novolog Vial Sliding Scale -)  1 vial SQ ACHS [facility]; Protocol


   Last Admin: 11/04/18 06:23 Dose:  Not Given


Insulin Detemir (Levemir Vial)  15 units SQ 0700,2200 [facility]


   Last Admin: 11/04/18 06:21 Dose:  Not Given


Montelukast Sodium (Singulair -)  10 mg PO HS [facility]


   Last Admin: 11/03/18 22:09 Dose:  10 mg


Multivitamins (Total B With C -)  1 each PO DAILY [facility]


   Last Admin: 11/03/18 09:39 Dose:  Not Given


Non-Formulary Medication (Docosahexanoic Acid/Epa [Fish Oil Softgel])  1 each 

PO DAILY [facility]


Non-Formulary Medication (Meloxicam)  15 mg PO DAILY [facility]


Ondansetron HCl (Zofran Injection)  4 mg IVPUSH Q6H PRN


   PRN Reason: NAUSEA


Silver Sulfadiazine (Silvadene -)  1 applic TP DAILY [facility]


   Last Admin: 11/03/18 14:14 Dose:  Not Given


Tramadol HCl (Ultram -)  50 mg PO Q6H PRN


   PRN Reason: PAIN 4-6


   Last Admin: 11/04/18 08:20 Dose:  50 mg














cxr: clear lungs





ecg:  sr, nl intervals, no ischemic changes





echo 10/2018:  mild lvh, nl lv/rv, no sig valve path











a/p:  68 f hx morbid obesity, venous insuff/edema, chronic leg wounds, dm, htn, 

anemia, here with fever.





sepsis, cellulitis:


-abx per PMD, ID





venous insuff/le edema:


-on lasix at home, held here due to santi likely from sepsis, Cr stable now. cont 

holding





htn:


-cont home meds





anemia:


-pt has baseline anemia but hgb dropped into 7's 11/1, no obvious bleeding, may 

just be dilutional from ivfs she got.  got prbcs, hgb stable





NSTEMI:


-trop was normal then repeat increased to 1.6 (now trending down).  Possibly 

just related to sepsis and santi but she did mention chest pressure and the jump 

from normal to 1.6 may be consistent with ACS.  ECG and echo unremarkable.  

Already getting asa, hep gtt. hold off on plavix for now given her anemia 

requiring prbcs.  Monitor hgb.  Tele.  Trend trops.  Started lipitor.


-ischemic eval after acute issues resolve


- dc heparin gtt

## 2018-11-05 LAB
ANION GAP SERPL CALC-SCNC: 5 MMOL/L (ref 8–16)
ANISOCYTOSIS BLD QL: (no result)
BASOPHILS # BLD: 0.6 % (ref 0–2)
BUN SERPL-MCNC: 25 MG/DL (ref 7–18)
CALCIUM SERPL-MCNC: 7.9 MG/DL (ref 8.5–10.1)
CHLORIDE SERPL-SCNC: 104 MMOL/L (ref 98–107)
CO2 SERPL-SCNC: 27 MMOL/L (ref 21–32)
CREAT SERPL-MCNC: 1.2 MG/DL (ref 0.55–1.3)
DEPRECATED RDW RBC AUTO: 15.8 % (ref 11.6–15.6)
EOSINOPHIL # BLD: 2.4 % (ref 0–4.5)
GLUCOSE SERPL-MCNC: 100 MG/DL (ref 74–106)
HCT VFR BLD CALC: 24.4 % (ref 32.4–45.2)
HGB BLD-MCNC: 8.3 GM/DL (ref 10.7–15.3)
LYMPHOCYTES # BLD: 11.8 % (ref 8–40)
MACROCYTES BLD QL: 0
MCH RBC QN AUTO: 27.6 PG (ref 25.7–33.7)
MCHC RBC AUTO-ENTMCNC: 34.1 G/DL (ref 32–36)
MCV RBC: 80.9 FL (ref 80–96)
MONOCYTES # BLD AUTO: 11.2 % (ref 3.8–10.2)
NEUTROPHILS # BLD: 74 % (ref 42.8–82.8)
PLATELET # BLD AUTO: 327 K/MM3 (ref 134–434)
PLATELET BLD QL SMEAR: NORMAL
PMV BLD: 7.5 FL (ref 7.5–11.1)
POTASSIUM SERPLBLD-SCNC: 4.7 MMOL/L (ref 3.5–5.1)
RBC # BLD AUTO: 3.02 M/MM3 (ref 3.6–5.2)
SODIUM SERPL-SCNC: 137 MMOL/L (ref 136–145)
WBC # BLD AUTO: 8.7 K/MM3 (ref 4–10)

## 2018-11-05 RX ADMIN — LOSARTAN POTASSIUM AND HYDROCHLOROTHIAZIDE TABLETS SCH TAB: 50; 12.5 TABLET, FILM COATED ORAL at 10:49

## 2018-11-05 RX ADMIN — BUDESONIDE AND FORMOTEROL FUMARATE DIHYDRATE SCH PUFF: 160; 4.5 AEROSOL RESPIRATORY (INHALATION) at 10:50

## 2018-11-05 RX ADMIN — SILVER SULFADIAZINE SCH: 10 CREAM TOPICAL at 10:50

## 2018-11-05 RX ADMIN — CEFTRIAXONE SODIUM SCH MLS/HR: 2 INJECTION, POWDER, FOR SOLUTION INTRAMUSCULAR; INTRAVENOUS at 10:47

## 2018-11-05 RX ADMIN — INSULIN ASPART SCH: 100 INJECTION, SOLUTION INTRAVENOUS; SUBCUTANEOUS at 06:07

## 2018-11-05 RX ADMIN — AMLODIPINE BESYLATE SCH MG: 5 TABLET ORAL at 10:48

## 2018-11-05 RX ADMIN — AMLODIPINE BESYLATE SCH MG: 5 TABLET ORAL at 21:26

## 2018-11-05 RX ADMIN — GABAPENTIN SCH MG: 100 CAPSULE ORAL at 21:26

## 2018-11-05 RX ADMIN — ASPIRIN 81 MG SCH MG: 81 TABLET ORAL at 10:48

## 2018-11-05 RX ADMIN — ATORVASTATIN CALCIUM SCH MG: 40 TABLET, FILM COATED ORAL at 21:26

## 2018-11-05 RX ADMIN — Medication SCH MG: at 10:51

## 2018-11-05 RX ADMIN — GLYBURIDE SCH MG: 5 TABLET ORAL at 17:06

## 2018-11-05 RX ADMIN — COLLAGENASE SANTYL SCH: 250 OINTMENT TOPICAL at 10:50

## 2018-11-05 RX ADMIN — CYCLOBENZAPRINE HYDROCHLORIDE SCH MG: 10 TABLET, FILM COATED ORAL at 21:19

## 2018-11-05 RX ADMIN — INSULIN DETEMIR SCH: 100 INJECTION, SOLUTION SUBCUTANEOUS at 06:07

## 2018-11-05 RX ADMIN — GLYBURIDE SCH MG: 5 TABLET ORAL at 06:09

## 2018-11-05 RX ADMIN — VITAMIN D, TAB 1000IU (100/BT) SCH UNIT: 25 TAB at 10:48

## 2018-11-05 RX ADMIN — INSULIN ASPART SCH UNIT: 100 INJECTION, SOLUTION INTRAVENOUS; SUBCUTANEOUS at 21:20

## 2018-11-05 RX ADMIN — VITAMIN C SCH EACH: TAB at 10:51

## 2018-11-05 RX ADMIN — GABAPENTIN SCH MG: 100 CAPSULE ORAL at 05:41

## 2018-11-05 RX ADMIN — GABAPENTIN SCH MG: 100 CAPSULE ORAL at 17:07

## 2018-11-05 RX ADMIN — INSULIN DETEMIR SCH UNIT: 100 INJECTION, SOLUTION SUBCUTANEOUS at 21:25

## 2018-11-05 RX ADMIN — MONTELUKAST SODIUM SCH MG: 10 TABLET, COATED ORAL at 21:19

## 2018-11-05 RX ADMIN — BUDESONIDE AND FORMOTEROL FUMARATE DIHYDRATE SCH PUFF: 160; 4.5 AEROSOL RESPIRATORY (INHALATION) at 21:26

## 2018-11-05 RX ADMIN — INSULIN ASPART SCH: 100 INJECTION, SOLUTION INTRAVENOUS; SUBCUTANEOUS at 12:11

## 2018-11-05 NOTE — PN
Progress Note (short form)





- Note


Progress Note: 








Progress Note: 


s: no cp sob palps dizzy. complains of bilateral leg pain





o:


   


 Vital Signs











 Period  Temp  Pulse  Resp  BP Sys/Guevara  Pulse Ox


 


 Last 24 Hr  98 F-98.7 F  65-87  18-20  127-185/41-77  97-97











nad no jvd


rrr s1s2 no mrg


cta bl nl eff


aaox3


trace le edema, nonpitting edema, chronic stasis changes of skin


abd nt nd pos bs


no jaundice diaphoresis


  


 Current Medications





Acetaminophen (Tylenol -)  650 mg PO Q6H PRN


   PRN Reason: FEVER


   Last Admin: 11/04/18 21:55 Dose:  650 mg


Amlodipine Besylate (Norvasc -)  5 mg PO BID [facility]


   Last Admin: 11/05/18 10:48 Dose:  5 mg


Ascorbic Acid (Vitamin C -)  250 mg PO DAILY [facility]


   Last Admin: 11/05/18 10:51 Dose:  250 mg


Aspirin (Asa -)  81 mg PO DAILY [facility]


   Last Admin: 11/05/18 10:48 Dose:  81 mg


Atorvastatin Calcium (Lipitor -)  40 mg PO HS [facility]


   Last Admin: 11/04/18 21:49 Dose:  40 mg


Budesonide/Formoterol Fumarate (Symbicort 160/4.5mcg -)  2 puff IH BID [facility]


   Last Admin: 11/05/18 10:50 Dose:  2 puff


Cholecalciferol (Vitamin D3 -)  1,000 unit PO DAILY [facility]


   Last Admin: 11/05/18 10:48 Dose:  1,000 unit


Clonidine (Catapres -)  0.2 mg PO BID [facility]


   Last Admin: 11/05/18 10:48 Dose:  0.2 mg


Collagenase (Santyl -)  1 applic TP DAILY [facility]; Protocol


   Last Admin: 11/05/18 10:50 Dose:  Not Given


Cyclobenzaprine HCl (Flexeril -)  10 mg PO HS [facility]


   Last Admin: 11/04/18 21:50 Dose:  10 mg


Gabapentin (Neurontin -)  100 mg PO TID [facility]


   Last Admin: 11/05/18 05:41 Dose:  100 mg


Glyburide (Diabeta -)  5 mg PO BIDAC [facility]


   Last Admin: 11/05/18 06:09 Dose:  5 mg


HCTZ/Losartan Potassium (Hyzaar -)  2 tab PO DAILY [facility]


   Last Admin: 11/05/18 10:49 Dose:  2 tab


Ceftriaxone Sodium 2 gm/ (Dextrose)  100 mls @ 100 mls/hr IVPB DAILY [facility]; 

Protocol


   Last Admin: 11/05/18 10:47 Dose:  100 mls/hr


Insulin Aspart (Novolog Vial Sliding Scale -)  1 vial SQ ACHS [facility]; Protocol


   Last Admin: 11/05/18 12:11 Dose:  Not Given


Insulin Detemir (Levemir Vial)  15 units SQ 0700,2200 [facility]


   Last Admin: 11/05/18 06:07 Dose:  Not Given


Montelukast Sodium (Singulair -)  10 mg PO HS [facility]


   Last Admin: 11/04/18 21:50 Dose:  10 mg


Multivitamins (Total B With C -)  1 each PO DAILY [facility]


   Last Admin: 11/05/18 10:51 Dose:  1 each


Non-Formulary Medication (Docosahexanoic Acid/Epa [Fish Oil Softgel])  1 each 

PO DAILY [facility]


Ondansetron HCl (Zofran Injection)  4 mg IVPUSH Q6H PRN


   PRN Reason: NAUSEA


Silver Sulfadiazine (Silvadene -)  1 applic TP DAILY [facility]


   Last Admin: 11/05/18 10:50 Dose:  Not Given


Tramadol HCl (Ultram -)  50 mg PO Q6H PRN


   PRN Reason: PAIN 4-6


   Last Admin: 11/05/18 12:10 Dose:  50 mg




















cxr: clear lungs





ecg:  sr, nl intervals, no ischemic changes





echo 10/2018:  mild lvh, nl lv/rv, no sig valve path











a/p:  68 f hx morbid obesity, venous insuff/edema, chronic leg wounds, dm, htn, 

anemia, here with fever.





sepsis, cellulitis:


-abx per PMD, ID


- vascular consulted, Dr. Saini





venous insuff/le edema:


-on lasix at home, held here due to santi likely from sepsis, Cr stable now. cont 

holding





htn:


-cont home meds





anemia:


-pt has baseline anemia but hgb dropped into 7's 11/1, no obvious bleeding, may 

just be dilutional from ivfs she got.  got prbcs, hgb stable





NSTEMI:


-trop was normal then repeat increased to 1.6 (now trending down).  Possibly 

just related to sepsis and santi but she did mention chest pressure and the jump 

from normal to 1.6 may be consistent with ACS.  ECG and echo unremarkable.  

received hep gtt. deferred plavix for now given her anemia requiring prbcs. 


- continue lipitor, aspirin


-ischemic eval after acute issues resolve

## 2018-11-05 NOTE — CONSULT
Consult


Consult Specialty:: Vascular Surgery 


Reason for Consultation:: bl lower ext venous stasis ulcers





- History Source


Limitations to Obtaining History: No Limitations





- Past Medical History


Cardio/Vascular: Yes: HTN, Hyperlipdemia


Endocrine: Yes: Diabetes Mellitus





- Past Surgical History


Past Surgical History: Yes: Amputation (L toe)





- Alcohol/Substance Use


Hx Alcohol Use: No


History of Substance Use: reports: None





- Smoking History


Smoking history: Never smoked


Have you smoked in the past 12 months: No





- Social History


ADL: Family Assistance


History of Recent Travel: No





Home Medications





- Allergies


Allergies/Adverse Reactions: 


 Allergies











Allergy/AdvReac Type Severity Reaction Status Date / Time


 


No Known Allergies Allergy   Verified 10/31/18 14:17














- Home Medications


Home Medications: 


Ambulatory Orders





Aspirin [Baby Aspirin] 81 mg PO DAILY 05/17/12 


Furosemide [Lasix] 20 mg PO DAILY 05/17/12 


Montelukast Na [Singulair] 10 mg PO HS 05/17/12 


Gabapentin 1 cap PO TID 02/11/14 


Amlodipine Besylate [Norvasc -] 5 mg PO BID 03/31/14 


Ascorbic Acid [Vitamin C] 250 mg PO DAILY 03/31/14 


Cholecalciferol (Vitamin D3) [Vitamin D] 1,000 unit PO DAILY 03/31/14 


Docosahexanoic Acid/Epa [Fish Oil Softgel] 1 each PO DAILY 03/31/14 


Glyburide 5 mg PO BID 03/31/14 


Ibuprofen [Advil -] 400 mg PO QID PRN 03/31/14 


Insulin Glargine,Hum.rec.anlog [Lantus Solostar PEN -] 30 units SCJ DAILY 03/31/ 14 


Losartan/Hydrochlorothiazide [Losartan-Hctz 100-25 mg Tab] 1 tab PO DAILY 03/31/ 14 


Meloxicam [Mobic -] 15 mg PO DAILY 03/31/14 


Tramadol HCl 50 mg PO QID PRN 03/31/14 


Vitamin B Complex 1 each PO DAILY 03/31/14 


oxyCODONE HCL [Roxicodone -] 5 mg PO Q6H PRN 03/31/14 


Clonidine 0.1 mg PO HS 07/15/14 


Cyclobenzaprine HCl 10 mg PO HS 05/12/15 











Family Disease History





- Family Disease History


Family Disease History: CA: Father (prostate), Other: Mother (CVA), Brother (

thyroid nodule)





Review of Systems





- Review of Systems


Constitutional: reports: No Symptoms


Eyes: reports: No Symptoms


HENT: reports: No Symptoms


Neck: reports: No Symptoms


Cardiovascular: reports: No Symptoms


Respiratory: reports: No Symptoms


Gastrointestinal: reports: No Symptoms


Genitourinary: reports: No Symptoms


Breasts: reports: No Symptoms Reported


Musculoskeletal: reports: No Symptoms


Integumentary: reports: No Symptoms


Neurological: reports: No Symptoms


Endocrine: reports: No Symptoms


Hematology/Lymphatic: reports: No Symptoms


Psychiatric: reports: No Symptoms





Physical Exam


Vital Signs: 


 Vital Signs











Temperature  98.4 F   11/05/18 05:00


 


Pulse Rate  65   11/05/18 05:00


 


Respiratory Rate  20   11/05/18 05:00


 


Blood Pressure  133/71   11/05/18 05:00


 


O2 Sat by Pulse Oximetry (%)  97   11/04/18 21:00











Constitutional: Yes: Well Nourished, No Distress, Calm


Eyes: Yes: WNL, Conjunctiva Clear, EOM Intact


HENT: Yes: WNL, Atraumatic, Normocephalic


Neck: Yes: WNL, Supple, Trachea Midline


Cardiovascular: Yes: WNL, Regular Rate and Rhythm


Respiratory: Yes: WNL, Regular, CTA Bilaterally


Gastrointestinal: Yes: WNL, Normal Bowel Sounds


...Rectal Exam: Yes: WNL


Renal/: Yes: WNL


Breast(s): Yes: WNL


Musculoskeletal: Yes: WNL


Extremities: Yes: WNL, Other (bl lower ext venous stasis ulcers  Left heel ulcer

)


Edema: Yes


Edema: LLE: 2+, RLE: 2+


Integumentary: Yes: WNL


Neurological: Yes: WNL, Alert, Oriented


...Motor Strength: WNL


Psychiatric: Yes: WNL


Labs: 


 CBC, BMP





 11/05/18 05:30 





 11/05/18 05:30 











Problem List





- Problems


(1) Venous stasis ulcers of both lower extremities


Assessment/Plan: 


Bilateral lower extremity venous stasis ulcers with left heel ulcer. 





1. Pt has silver alginate bedside. Please apply every other day with ace to 

venous stasis ulcers. 


2. Santyl to left heel ulcer daily 


Code(s): I83.019 - VARICOSE VEINS OF RIGHT LOWER EXTREMITY W ULCER OF UNSP SITE

; I83.029 - VARICOSE VEINS OF LEFT LOWER EXTREMITY W ULCER OF UNSP SITE; 

L97.919 - NON-PRS CHRONIC ULC UNSP PRT OF R LOW LEG W UNSP SEVERITY; L97.929 - 

NON-PRS CHRONIC ULC UNSP PRT OF L LOW LEG W UNSP SEVERITY

## 2018-11-05 NOTE — PN
Progress Note, Physician


History of Present Illness: 


Awake, alert


Temps down   Afebrile


Reports less leg pain


WBC decreased 8.7


Repeat BC no growth





- Current Medication List


Current Medications: 


Active Medications





Acetaminophen (Tylenol -)  650 mg PO Q6H PRN


   PRN Reason: FEVER


   Last Admin: 11/04/18 21:55 Dose:  650 mg


Amlodipine Besylate (Norvasc -)  5 mg PO BID [facility]


   Last Admin: 11/05/18 10:48 Dose:  5 mg


Ascorbic Acid (Vitamin C -)  250 mg PO DAILY [facility]


   Last Admin: 11/05/18 10:51 Dose:  250 mg


Aspirin (Asa -)  81 mg PO DAILY [facility]


   Last Admin: 11/05/18 10:48 Dose:  81 mg


Atorvastatin Calcium (Lipitor -)  40 mg PO HS [facility]


   Last Admin: 11/04/18 21:49 Dose:  40 mg


Budesonide/Formoterol Fumarate (Symbicort 160/4.5mcg -)  2 puff IH BID [facility]


   Last Admin: 11/05/18 10:50 Dose:  2 puff


Cholecalciferol (Vitamin D3 -)  1,000 unit PO DAILY [facility]


   Last Admin: 11/05/18 10:48 Dose:  1,000 unit


Clonidine (Catapres -)  0.2 mg PO BID [facility]


   Last Admin: 11/05/18 10:48 Dose:  0.2 mg


Collagenase (Santyl -)  1 applic TP DAILY [facility]; Protocol


   Last Admin: 11/05/18 10:50 Dose:  Not Given


Cyclobenzaprine HCl (Flexeril -)  10 mg PO HS [facility]


   Last Admin: 11/04/18 21:50 Dose:  10 mg


Gabapentin (Neurontin -)  100 mg PO TID [facility]


   Last Admin: 11/05/18 05:41 Dose:  100 mg


Glyburide (Diabeta -)  5 mg PO BIDAC [facility]


   Last Admin: 11/05/18 06:09 Dose:  5 mg


HCTZ/Losartan Potassium (Hyzaar -)  2 tab PO DAILY [facility]


   Last Admin: 11/05/18 10:49 Dose:  2 tab


Ceftriaxone Sodium 2 gm/ (Dextrose)  100 mls @ 100 mls/hr IVPB DAILY [facility]; 

Protocol


   Last Admin: 11/05/18 10:47 Dose:  100 mls/hr


Insulin Aspart (Novolog Vial Sliding Scale -)  1 vial SQ ACHS [facility]; Protocol


   Last Admin: 11/05/18 12:11 Dose:  Not Given


Insulin Detemir (Levemir Vial)  15 units SQ 0700,2200 [facility]


   Last Admin: 11/05/18 06:07 Dose:  Not Given


Montelukast Sodium (Singulair -)  10 mg PO HS [facility]


   Last Admin: 11/04/18 21:50 Dose:  10 mg


Multivitamins (Total B With C -)  1 each PO DAILY [facility]


   Last Admin: 11/05/18 10:51 Dose:  1 each


Non-Formulary Medication (Docosahexanoic Acid/Epa [Fish Oil Softgel])  1 each 

PO DAILY [facility]


Ondansetron HCl (Zofran Injection)  4 mg IVPUSH Q6H PRN


   PRN Reason: NAUSEA


Silver Sulfadiazine (Silvadene -)  1 applic TP DAILY [facility]


   Last Admin: 11/05/18 10:50 Dose:  Not Given


Tramadol HCl (Ultram -)  50 mg PO Q6H PRN


   PRN Reason: PAIN 4-6


   Last Admin: 11/05/18 12:10 Dose:  50 mg











- Objective


Vital Signs: 


 Vital Signs











Temperature  98 F   11/05/18 09:00


 


Pulse Rate  70   11/05/18 09:00


 


Respiratory Rate  20   11/05/18 09:00


 


Blood Pressure  127/66   11/05/18 09:00


 


O2 Sat by Pulse Oximetry (%)  97   11/05/18 09:00











Constitutional: Yes: No Distress, Obese


Eyes: Yes: Conjunctiva Clear


Cardiovascular: Yes: Regular Rate and Rhythm, S1, S2


Respiratory: Yes: CTA Bilaterally


Gastrointestinal: Yes: Normal Bowel Sounds, Soft, Abdomen, Obese.  No: 

Tenderness


Edema: Yes


Integumentary: Yes: Other (+ B/L distal LE ulcers, dry. Less weepage. + dry L 

heel ulcer. + erythema/warmth distal LE)


Labs: 


 CBC, BMP





 11/05/18 05:30 





 11/05/18 05:30 





 INR, PTT











INR  1.14  (0.83-1.09)  H  10/31/18  14:24    














Assessment/Plan





Grp B strep bacteremia/ sepsis secondary to wound source


Infected chronic leg ulcers


UTI


Morbid obesity


Continue ceftriaxone


Local wound care

## 2018-11-05 NOTE — PN
Progress Note, Physician


Chief Complaint: 


Ms Alvarez says she is doing better. Denies cp, sob, n/v.





- Current Medication List


Current Medications: 


Active Medications





Acetaminophen (Tylenol -)  650 mg PO Q6H PRN


   PRN Reason: FEVER


   Last Admin: 11/04/18 21:55 Dose:  650 mg


Amlodipine Besylate (Norvasc -)  5 mg PO BID [facility]


   Last Admin: 11/05/18 10:48 Dose:  5 mg


Ascorbic Acid (Vitamin C -)  250 mg PO DAILY [facility]


   Last Admin: 11/05/18 10:51 Dose:  250 mg


Aspirin (Asa -)  81 mg PO DAILY [facility]


   Last Admin: 11/05/18 10:48 Dose:  81 mg


Atorvastatin Calcium (Lipitor -)  40 mg PO HS [facility]


   Last Admin: 11/04/18 21:49 Dose:  40 mg


Budesonide/Formoterol Fumarate (Symbicort 160/4.5mcg -)  2 puff IH BID [facility]


   Last Admin: 11/05/18 10:50 Dose:  2 puff


Cholecalciferol (Vitamin D3 -)  1,000 unit PO DAILY [facility]


   Last Admin: 11/05/18 10:48 Dose:  1,000 unit


Clonidine (Catapres -)  0.2 mg PO BID [facility]


   Last Admin: 11/05/18 10:48 Dose:  0.2 mg


Collagenase (Santyl -)  1 applic TP DAILY [facility]; Protocol


   Last Admin: 11/05/18 10:50 Dose:  Not Given


Cyclobenzaprine HCl (Flexeril -)  10 mg PO HS [facility]


   Last Admin: 11/04/18 21:50 Dose:  10 mg


Gabapentin (Neurontin -)  100 mg PO TID [facility]


   Last Admin: 11/05/18 05:41 Dose:  100 mg


Glyburide (Diabeta -)  5 mg PO BIDAC [facility]


   Last Admin: 11/05/18 06:09 Dose:  5 mg


HCTZ/Losartan Potassium (Hyzaar -)  2 tab PO DAILY [facility]


   Last Admin: 11/05/18 10:49 Dose:  2 tab


Ceftriaxone Sodium 2 gm/ (Dextrose)  100 mls @ 100 mls/hr IVPB DAILY [facility]; 

Protocol


   Last Admin: 11/05/18 10:47 Dose:  100 mls/hr


Insulin Aspart (Novolog Vial Sliding Scale -)  1 vial SQ ACHS [facility]; Protocol


   Last Admin: 11/05/18 12:11 Dose:  Not Given


Insulin Detemir (Levemir Vial)  15 units SQ 0700,2200 [facility]


   Last Admin: 11/05/18 06:07 Dose:  Not Given


Montelukast Sodium (Singulair -)  10 mg PO HS [facility]


   Last Admin: 11/04/18 21:50 Dose:  10 mg


Multivitamins (Total B With C -)  1 each PO DAILY [facility]


   Last Admin: 11/05/18 10:51 Dose:  1 each


Ondansetron HCl (Zofran Injection)  4 mg IVPUSH Q6H PRN


   PRN Reason: NAUSEA


Silver Sulfadiazine (Silvadene -)  1 applic TP DAILY [facility]


   Last Admin: 11/05/18 10:50 Dose:  Not Given


Tramadol HCl (Ultram -)  50 mg PO Q6H PRN


   PRN Reason: PAIN 4-6


   Last Admin: 11/05/18 12:10 Dose:  50 mg











- Objective


Vital Signs: 


 Vital Signs











Temperature  36.8 C   11/05/18 15:28


 


Pulse Rate  80   11/05/18 15:28


 


Respiratory Rate  20   11/05/18 15:28


 


Blood Pressure  147/60   11/05/18 15:28


 


O2 Sat by Pulse Oximetry (%)  97   11/05/18 09:00











Constitutional: Yes: No Distress, Calm, Obese


Cardiovascular: Yes: Regular Rate and Rhythm.  No: Gallop, Murmur, Rub


Respiratory: Yes: Regular, CTA Bilaterally.  No: Rales, Rhonchi, Wheezes


Gastrointestinal: Yes: Normal Bowel Sounds, Soft.  No: Distention, Tenderness


Extremities: Yes: Other (bilateral LE wounds)


Edema: No


Labs: 


 CBC, BMP





 11/05/18 05:30 





 11/05/18 05:30 





 INR, PTT











INR  1.14  (0.83-1.09)  H  10/31/18  14:24    














Problem List





- Problems


(1) Sepsis


Code(s): A41.9 - SEPSIS, UNSPECIFIED ORGANISM   





(2) Cellulitis


Code(s): L03.90 - CELLULITIS, UNSPECIFIED   


Qualifiers: 


   Site of cellulitis: extremity   Site of cellulitis of extremity: lower 

extremity   Laterality: left   Qualified Code(s): L03.116 - Cellulitis of left 

lower limb   





(3) Foot ulcer, left


Code(s): L97.529 - NON-PRESSURE CHRONIC ULCER OTH PRT LEFT FOOT W UNSP SEVERITY

   





(4) Diabetes


Code(s): E11.9 - TYPE 2 DIABETES MELLITUS WITHOUT COMPLICATIONS   


Qualifiers: 


   Diabetes mellitus type: type 2   Diabetes mellitus long term insulin use: 

with long term use   Diabetes mellitus complication status: with skin 

complications   Diabetes mellitus complication detail: with other skin ulcer   

Qualified Code(s): E11.622 - Type 2 diabetes mellitus with other skin ulcer; 

Z79.4 - Long term (current) use of insulin   





(5) HTN (hypertension)


Code(s): I10 - ESSENTIAL (PRIMARY) HYPERTENSION   





(6) Leg edema


Code(s): R60.0 - LOCALIZED EDEMA   





(7) Morbid obesity


Code(s): E66.01 - MORBID (SEVERE) OBESITY DUE TO EXCESS CALORIES   





(8) Chronic pain


Code(s): G89.29 - OTHER CHRONIC PAIN   


Qualifiers: 


   Chronic pain type: chronic pain syndrome   Qualified Code(s): G89.4 - 

Chronic pain syndrome   





(9) NSTEMI (non-ST elevated myocardial infarction)


Code(s): I21.4 - NON-ST ELEVATION (NSTEMI) MYOCARDIAL INFARCTION   





Assessment/Plan





(1) Sepsis


Assessment/Plan: 


-cultures resulted


-ID following


-resolved


Code(s): A41.9 - SEPSIS, UNSPECIFIED ORGANISM   





(2) Cellulitis


Assessment/Plan: 


-appreciate wound care assistance


-continue rocephin


-source of sepsis


-ID note reviewed


Code(s): L03.90 - CELLULITIS, UNSPECIFIED   


Qualifiers: 


   Site of cellulitis: extremity   Site of cellulitis of extremity: lower 

extremity   Laterality: left   Qualified Code(s): L03.116 - Cellulitis of left 

lower limb   





(3) Foot ulcer, left


Assessment/Plan: 


-as above


Code(s): L97.529 - NON-PRESSURE CHRONIC ULCER OTH PRT LEFT FOOT W UNSP SEVERITY

   





(4) Diabetes


Assessment/Plan: 


-diabetic diet


-continue levemir 15 units bid


-continue FSBS and SSI


-controlled


Code(s): E11.9 - TYPE 2 DIABETES MELLITUS WITHOUT COMPLICATIONS   


Qualifiers: 


   Diabetes mellitus type: type 2   Diabetes mellitus long term insulin use: 

with long term use   Diabetes mellitus complication status: with skin 

complications   Diabetes mellitus complication detail: with other skin ulcer   

Qualified Code(s): E11.622 - Type 2 diabetes mellitus with other skin ulcer; 

Z79.4 - Long term (current) use of insulin   





(5) HTN (hypertension)


Assessment/Plan: 


-continue amlodipine and losartan HCT


-monitor


Code(s): I10 - ESSENTIAL (PRIMARY) HYPERTENSION   





(6) NSTEMI


Assessment/Plan: 


-case d/w cardiology


-heparin gtt stopped


-continue medical management


Code(s): R07.89 - OTHER CHEST PAIN   





(7) Leg edema


Assessment/Plan: 


-ECHO reviewed, no sign of CHF


-leg edema secondary to lymphedema


-holding lasix secondary to sepsis


Code(s): R60.0 - LOCALIZED EDEMA   





(8) Morbid obesity


Assessment/Plan: 


-outpatient regimen for weight loss


Code(s): E66.01 - MORBID (SEVERE) OBESITY DUE TO EXCESS CALORIES   





(9) Chronic pain


Assessment/Plan: 


-continue home regimen


Code(s): G89.29 - OTHER CHRONIC PAIN   


Qualifiers: 


   Chronic pain type: chronic pain syndrome   Qualified Code(s): G89.4 - 

Chronic pain syndrome   





(10) Anemia


-stable

## 2018-11-06 LAB
ANION GAP SERPL CALC-SCNC: 6 MMOL/L (ref 8–16)
BUN SERPL-MCNC: 28 MG/DL (ref 7–18)
CALCIUM SERPL-MCNC: 8.4 MG/DL (ref 8.5–10.1)
CHLORIDE SERPL-SCNC: 104 MMOL/L (ref 98–107)
CO2 SERPL-SCNC: 28 MMOL/L (ref 21–32)
CREAT SERPL-MCNC: 1 MG/DL (ref 0.55–1.3)
DEPRECATED RDW RBC AUTO: 16.1 % (ref 11.6–15.6)
GLUCOSE SERPL-MCNC: 97 MG/DL (ref 74–106)
HCT VFR BLD CALC: 24.5 % (ref 32.4–45.2)
HGB BLD-MCNC: 8.3 GM/DL (ref 10.7–15.3)
MAGNESIUM SERPL-MCNC: 2.4 MG/DL (ref 1.8–2.4)
MCH RBC QN AUTO: 27.4 PG (ref 25.7–33.7)
MCHC RBC AUTO-ENTMCNC: 33.9 G/DL (ref 32–36)
MCV RBC: 80.8 FL (ref 80–96)
PHOSPHATE SERPL-MCNC: 4.5 MG/DL (ref 2.5–4.9)
PLATELET # BLD AUTO: 377 K/MM3 (ref 134–434)
PMV BLD: 7.3 FL (ref 7.5–11.1)
POTASSIUM SERPLBLD-SCNC: 4.8 MMOL/L (ref 3.5–5.1)
RBC # BLD AUTO: 3.03 M/MM3 (ref 3.6–5.2)
SODIUM SERPL-SCNC: 138 MMOL/L (ref 136–145)
WBC # BLD AUTO: 10.7 K/MM3 (ref 4–10)

## 2018-11-06 RX ADMIN — INSULIN ASPART SCH UNIT: 100 INJECTION, SOLUTION INTRAVENOUS; SUBCUTANEOUS at 17:07

## 2018-11-06 RX ADMIN — CEFTRIAXONE SODIUM SCH MLS/HR: 2 INJECTION, POWDER, FOR SOLUTION INTRAMUSCULAR; INTRAVENOUS at 11:24

## 2018-11-06 RX ADMIN — INSULIN ASPART SCH: 100 INJECTION, SOLUTION INTRAVENOUS; SUBCUTANEOUS at 07:10

## 2018-11-06 RX ADMIN — VITAMIN D, TAB 1000IU (100/BT) SCH UNIT: 25 TAB at 11:23

## 2018-11-06 RX ADMIN — CARVEDILOL SCH MG: 12.5 TABLET, FILM COATED ORAL at 22:22

## 2018-11-06 RX ADMIN — LOSARTAN POTASSIUM AND HYDROCHLOROTHIAZIDE TABLETS SCH TAB: 50; 12.5 TABLET, FILM COATED ORAL at 11:22

## 2018-11-06 RX ADMIN — INSULIN ASPART SCH UNIT: 100 INJECTION, SOLUTION INTRAVENOUS; SUBCUTANEOUS at 22:26

## 2018-11-06 RX ADMIN — BUDESONIDE AND FORMOTEROL FUMARATE DIHYDRATE SCH PUFF: 160; 4.5 AEROSOL RESPIRATORY (INHALATION) at 11:24

## 2018-11-06 RX ADMIN — AMLODIPINE BESYLATE SCH MG: 5 TABLET ORAL at 22:20

## 2018-11-06 RX ADMIN — MONTELUKAST SODIUM SCH MG: 10 TABLET, COATED ORAL at 22:20

## 2018-11-06 RX ADMIN — CYCLOBENZAPRINE HYDROCHLORIDE SCH MG: 10 TABLET, FILM COATED ORAL at 22:20

## 2018-11-06 RX ADMIN — Medication SCH MG: at 11:23

## 2018-11-06 RX ADMIN — ASPIRIN 81 MG SCH MG: 81 TABLET ORAL at 11:23

## 2018-11-06 RX ADMIN — AMLODIPINE BESYLATE SCH MG: 5 TABLET ORAL at 11:23

## 2018-11-06 RX ADMIN — GABAPENTIN SCH MG: 100 CAPSULE ORAL at 06:46

## 2018-11-06 RX ADMIN — BUDESONIDE AND FORMOTEROL FUMARATE DIHYDRATE SCH PUFF: 160; 4.5 AEROSOL RESPIRATORY (INHALATION) at 22:24

## 2018-11-06 RX ADMIN — INSULIN ASPART SCH UNIT: 100 INJECTION, SOLUTION INTRAVENOUS; SUBCUTANEOUS at 11:39

## 2018-11-06 RX ADMIN — GLYBURIDE SCH MG: 5 TABLET ORAL at 17:06

## 2018-11-06 RX ADMIN — GABAPENTIN SCH MG: 100 CAPSULE ORAL at 22:22

## 2018-11-06 RX ADMIN — GABAPENTIN SCH MG: 100 CAPSULE ORAL at 14:37

## 2018-11-06 RX ADMIN — VITAMIN C SCH EACH: TAB at 11:23

## 2018-11-06 NOTE — PN
Progress Note, Physician


Chief Complaint: 


Ms Alvarez is without complaint. No cp, sob, n/v.





- Current Medication List


Current Medications: 


Active Medications





Acetaminophen (Tylenol -)  650 mg PO Q6H PRN


   PRN Reason: FEVER


   Last Admin: 11/04/18 21:55 Dose:  650 mg


Amlodipine Besylate (Norvasc -)  5 mg PO BID [facility]


   Last Admin: 11/05/18 21:26 Dose:  5 mg


Ascorbic Acid (Vitamin C -)  250 mg PO DAILY [facility]


   Last Admin: 11/05/18 10:51 Dose:  250 mg


Aspirin (Asa -)  81 mg PO DAILY [facility]


   Last Admin: 11/05/18 10:48 Dose:  81 mg


Atorvastatin Calcium (Lipitor -)  40 mg PO HS [facility]


   Last Admin: 11/05/18 21:26 Dose:  40 mg


Budesonide/Formoterol Fumarate (Symbicort 160/4.5mcg -)  2 puff IH BID [facility]


   Last Admin: 11/05/18 21:26 Dose:  2 puff


Cholecalciferol (Vitamin D3 -)  1,000 unit PO DAILY [facility]


   Last Admin: 11/05/18 10:48 Dose:  1,000 unit


Clonidine (Catapres -)  0.2 mg PO BID [facility]


   Last Admin: 11/05/18 21:19 Dose:  0.2 mg


Collagenase (Santyl -)  1 applic TP DAILY [facility]; Protocol


   Last Admin: 11/05/18 10:50 Dose:  Not Given


Cyclobenzaprine HCl (Flexeril -)  10 mg PO HS [facility]


   Last Admin: 11/05/18 21:19 Dose:  10 mg


Gabapentin (Neurontin -)  100 mg PO TID [facility]


   Last Admin: 11/06/18 06:46 Dose:  100 mg


Glyburide (Diabeta -)  5 mg PO BIDAC [facility]


   Last Admin: 11/05/18 17:06 Dose:  5 mg


HCTZ/Losartan Potassium (Hyzaar -)  2 tab PO DAILY [facility]


   Last Admin: 11/05/18 10:49 Dose:  2 tab


Ceftriaxone Sodium 2 gm/ (Dextrose)  100 mls @ 100 mls/hr IVPB DAILY [facility]; 

Protocol


   Last Admin: 11/05/18 10:47 Dose:  100 mls/hr


Insulin Aspart (Novolog Vial Sliding Scale -)  1 vial SQ ACHS [facility]; Protocol


   Last Admin: 11/05/18 21:20 Dose:  4 unit


Insulin Detemir (Levemir Vial)  15 units SQ HS [facility]


Insulin Detemir (Levemir Vial)  20 units SQ AM [facility]


Metoprolol Tartrate (Lopressor -)  25 mg PO BID [facility]


Montelukast Sodium (Singulair -)  10 mg PO HS [facility]


   Last Admin: 11/05/18 21:19 Dose:  10 mg


Multivitamins (Total B With C -)  1 each PO DAILY [facility]


   Last Admin: 11/05/18 10:51 Dose:  1 each


Ondansetron HCl (Zofran Injection)  4 mg IVPUSH Q6H PRN


   PRN Reason: NAUSEA


Silver Sulfadiazine (Silvadene -)  1 applic TP DAILY [facility]


   Last Admin: 11/05/18 10:50 Dose:  Not Given


Tramadol HCl (Ultram -)  50 mg PO Q6H PRN


   PRN Reason: PAIN 4-6


   Last Admin: 11/05/18 12:10 Dose:  50 mg











- Objective


Vital Signs: 


 Vital Signs











Temperature  36.9 C   11/06/18 02:00


 


Pulse Rate  86   11/06/18 06:00


 


Respiratory Rate  20   11/06/18 06:00


 


Blood Pressure  143/82   11/06/18 06:00


 


O2 Sat by Pulse Oximetry (%)  95   11/05/18 21:00











Constitutional: Yes: No Distress, Calm, Obese


Cardiovascular: Yes: Regular Rate and Rhythm.  No: Gallop, Murmur, Rub


Respiratory: Yes: Regular, CTA Bilaterally.  No: Rales, Rhonchi, Wheezes


Gastrointestinal: Yes: Normal Bowel Sounds, Soft.  No: Distention, Tenderness


Extremities: Yes: Other (BLE bandaged)


Edema: No


Labs: 


 CBC, BMP





 11/06/18 05:30 





 11/06/18 05:30 





 INR, PTT











INR  1.14  (0.83-1.09)  H  10/31/18  14:24    














Problem List





- Problems


(1) Sepsis


Code(s): A41.9 - SEPSIS, UNSPECIFIED ORGANISM   





(2) Cellulitis


Code(s): L03.90 - CELLULITIS, UNSPECIFIED   


Qualifiers: 


   Site of cellulitis: extremity   Site of cellulitis of extremity: lower 

extremity   Laterality: left   Qualified Code(s): L03.116 - Cellulitis of left 

lower limb   





(3) Foot ulcer, left


Code(s): L97.529 - NON-PRESSURE CHRONIC ULCER OTH PRT LEFT FOOT W UNSP SEVERITY

   





(4) Diabetes


Code(s): E11.9 - TYPE 2 DIABETES MELLITUS WITHOUT COMPLICATIONS   


Qualifiers: 


   Diabetes mellitus type: type 2   Diabetes mellitus long term insulin use: 

with long term use   Diabetes mellitus complication status: with skin 

complications   Diabetes mellitus complication detail: with other skin ulcer   

Qualified Code(s): E11.622 - Type 2 diabetes mellitus with other skin ulcer; 

Z79.4 - Long term (current) use of insulin   





(5) HTN (hypertension)


Code(s): I10 - ESSENTIAL (PRIMARY) HYPERTENSION   





(6) Leg edema


Code(s): R60.0 - LOCALIZED EDEMA   





(7) Morbid obesity


Code(s): E66.01 - MORBID (SEVERE) OBESITY DUE TO EXCESS CALORIES   





(8) Chronic pain


Code(s): G89.29 - OTHER CHRONIC PAIN   


Qualifiers: 


   Chronic pain type: chronic pain syndrome   Qualified Code(s): G89.4 - 

Chronic pain syndrome   





(9) NSTEMI (non-ST elevated myocardial infarction)


Code(s): I21.4 - NON-ST ELEVATION (NSTEMI) MYOCARDIAL INFARCTION   





Assessment/Plan





(1) Sepsis


Assessment/Plan: 


-cultures resulted


-ID following


-resolved


Code(s): A41.9 - SEPSIS, UNSPECIFIED ORGANISM   





(2) Cellulitis


Assessment/Plan: 


-appreciate wound care assistance


-continue rocephin


-ID following, to decide duration of antibiotic


Code(s): L03.90 - CELLULITIS, UNSPECIFIED   


Qualifiers: 


   Site of cellulitis: extremity   Site of cellulitis of extremity: lower 

extremity   Laterality: left   Qualified Code(s): L03.116 - Cellulitis of left 

lower limb   





(3) Foot ulcer, left


Assessment/Plan: 


-as above


Code(s): L97.529 - NON-PRESSURE CHRONIC ULCER OTH PRT LEFT FOOT W UNSP SEVERITY

   





(4) Diabetes


Assessment/Plan: 


-glucose higher in the evenings


-increase am levemir to 20 units


-can discharge back on home lantus dose


Code(s): E11.9 - TYPE 2 DIABETES MELLITUS WITHOUT COMPLICATIONS   


Qualifiers: 


   Diabetes mellitus type: type 2   Diabetes mellitus long term insulin use: 

with long term use   Diabetes mellitus complication status: with skin 

complications   Diabetes mellitus complication detail: with other skin ulcer   

Qualified Code(s): E11.622 - Type 2 diabetes mellitus with other skin ulcer; 

Z79.4 - Long term (current) use of insulin   





(5) HTN (hypertension)


Assessment/Plan: 


-elevated


-continue clonidine, amlodipine, and losartan/HCTZ


-since with NSTEMI, will trial metoprolol as well


Code(s): I10 - ESSENTIAL (PRIMARY) HYPERTENSION   





(6) NSTEMI


Assessment/Plan: 


-continue medical management


Code(s): R07.89 - OTHER CHEST PAIN   





(7) Leg edema


Assessment/Plan: 


-ECHO reviewed, no sign of CHF


-leg edema secondary to lymphedema


-holding lasix secondary to sepsis


Code(s): R60.0 - LOCALIZED EDEMA   





(8) Morbid obesity


Assessment/Plan: 


-outpatient regimen for weight loss


Code(s): E66.01 - MORBID (SEVERE) OBESITY DUE TO EXCESS CALORIES   





(9) Chronic pain


Assessment/Plan: 


-continue home regimen


Code(s): G89.29 - OTHER CHRONIC PAIN   


Qualifiers: 


   Chronic pain type: chronic pain syndrome   Qualified Code(s): G89.4 - 

Chronic pain syndrome   





(10) Anemia


-stable

## 2018-11-06 NOTE — PN
Progress Note, Physician


History of Present Illness: 


Awake, alert


Temps down   Afebrile


Reports less leg pain


Repeat BC no growth


Valenzuela catheter in place





- Current Medication List


Current Medications: 


Active Medications





Acetaminophen (Tylenol -)  650 mg PO Q6H PRN


   PRN Reason: FEVER


   Last Admin: 11/04/18 21:55 Dose:  650 mg


Amlodipine Besylate (Norvasc -)  5 mg PO BID [facility]


   Last Admin: 11/06/18 11:23 Dose:  5 mg


Ascorbic Acid (Vitamin C -)  250 mg PO DAILY [facility]


   Last Admin: 11/06/18 11:23 Dose:  250 mg


Aspirin (Asa -)  81 mg PO DAILY [facility]


   Last Admin: 11/06/18 11:23 Dose:  81 mg


Atorvastatin Calcium (Lipitor -)  80 mg PO HS [facility]


Budesonide/Formoterol Fumarate (Symbicort 160/4.5mcg -)  2 puff IH BID [facility]


   Last Admin: 11/06/18 11:24 Dose:  2 puff


Carvedilol (Coreg -)  12.5 mg PO BID [facility]


Cholecalciferol (Vitamin D3 -)  1,000 unit PO DAILY [facility]


   Last Admin: 11/06/18 11:23 Dose:  1,000 unit


Clonidine (Catapres -)  0.2 mg PO BID [facility]


   Last Admin: 11/06/18 11:23 Dose:  0.2 mg


Collagenase (Santyl -)  1 applic TP DAILY [facility]; Protocol


   Last Admin: 11/05/18 10:50 Dose:  Not Given


Cyclobenzaprine HCl (Flexeril -)  10 mg PO HS [facility]


   Last Admin: 11/05/18 21:19 Dose:  10 mg


Gabapentin (Neurontin -)  100 mg PO TID [facility]


   Last Admin: 11/06/18 14:37 Dose:  100 mg


Glyburide (Diabeta -)  5 mg PO BIDAC [facility]


   Last Admin: 11/05/18 17:06 Dose:  5 mg


HCTZ/Losartan Potassium (Hyzaar -)  2 tab PO DAILY [facility]


   Last Admin: 11/06/18 11:22 Dose:  2 tab


Ceftriaxone Sodium 2 gm/ (Dextrose)  100 mls @ 100 mls/hr IVPB DAILY [facility]; 

Protocol


   Last Admin: 11/06/18 11:24 Dose:  100 mls/hr


Insulin Aspart (Novolog Vial Sliding Scale -)  1 vial SQ ACHS [facility]; Protocol


   Last Admin: 11/06/18 11:39 Dose:  2 unit


Insulin Detemir (Levemir Vial)  15 units SQ HS [facility]


Insulin Detemir (Levemir Vial)  20 units SQ AM [facility]


Montelukast Sodium (Singulair -)  10 mg PO HS [facility]


   Last Admin: 11/05/18 21:19 Dose:  10 mg


Multivitamins (Total B With C -)  1 each PO DAILY [facility]


   Last Admin: 11/06/18 11:23 Dose:  1 each


Ondansetron HCl (Zofran Injection)  4 mg IVPUSH Q6H PRN


   PRN Reason: NAUSEA


Silver Sulfadiazine (Silvadene -)  1 applic TP DAILY [facility]


   Last Admin: 11/05/18 10:50 Dose:  Not Given


Tramadol HCl (Ultram -)  50 mg PO Q6H PRN


   PRN Reason: PAIN 4-6


   Last Admin: 11/06/18 11:33 Dose:  50 mg











- Objective


Vital Signs: 


 Vital Signs











Temperature  97.5 F L  11/06/18 16:07


 


Pulse Rate  86   11/06/18 16:07


 


Respiratory Rate  22 H  11/06/18 16:07


 


Blood Pressure  156/66   11/06/18 16:07


 


O2 Sat by Pulse Oximetry (%)  95   11/05/18 21:00











Constitutional: Yes: No Distress, Obese


Cardiovascular: Yes: Regular Rate and Rhythm, S1, S2


Respiratory: Yes: CTA Bilaterally


Gastrointestinal: Yes: Normal Bowel Sounds, Soft, Abdomen, Obese


Extremities: Yes: Other (dressings in place LE bilaterally)


Labs: 


 CBC, BMP





 11/06/18 05:30 





 11/06/18 05:30 





 INR, PTT











INR  1.14  (0.83-1.09)  H  10/31/18  14:24    














Assessment/Plan





Grp B strep bacteremia/ sepsis secondary to wound source


Infected chronic leg ulcers


UTI


Morbid obesity





Pt would not allow me to remove dressings today





Continue ceftriaxone.


Will need 14d course IV ceftriaxone for grp B strep bacteremia


Local wound care

## 2018-11-06 NOTE — PN
Progress Note, Physician


Chief Complaint: 


leg wound


History of Present Illness: 





she cannot recall precise timing of chest heaviness at home, but at some time 

in the week PTA.


has had mild "weighty" feeling in chest at least once or twice since here in 

hosp--cannot recall precisely when.


none today





no sob.


no palpitations, syncope





had stress test ? when with a new [facility] (> 1 yr ago)--normal she 

thinks





no cigs





- Current Medication List


Current Medications: 


Active Medications





Acetaminophen (Tylenol -)  650 mg PO Q6H PRN


   PRN Reason: FEVER


   Last Admin: 11/04/18 21:55 Dose:  650 mg


Amlodipine Besylate (Norvasc -)  5 mg PO BID [facility]


   Last Admin: 11/06/18 11:23 Dose:  5 mg


Ascorbic Acid (Vitamin C -)  250 mg PO DAILY [facility]


   Last Admin: 11/06/18 11:23 Dose:  250 mg


Aspirin (Asa -)  81 mg PO DAILY [facility]


   Last Admin: 11/06/18 11:23 Dose:  81 mg


Atorvastatin Calcium (Lipitor -)  40 mg PO HS [facility]


   Last Admin: 11/05/18 21:26 Dose:  40 mg


Budesonide/Formoterol Fumarate (Symbicort 160/4.5mcg -)  2 puff IH BID [facility]


   Last Admin: 11/06/18 11:24 Dose:  2 puff


Cholecalciferol (Vitamin D3 -)  1,000 unit PO DAILY [facility]


   Last Admin: 11/06/18 11:23 Dose:  1,000 unit


Clonidine (Catapres -)  0.2 mg PO BID [facility]


   Last Admin: 11/06/18 11:23 Dose:  0.2 mg


Collagenase (Santyl -)  1 applic TP DAILY [facility]; Protocol


   Last Admin: 11/05/18 10:50 Dose:  Not Given


Cyclobenzaprine HCl (Flexeril -)  10 mg PO HS [facility]


   Last Admin: 11/05/18 21:19 Dose:  10 mg


Gabapentin (Neurontin -)  100 mg PO TID [facility]


   Last Admin: 11/06/18 14:37 Dose:  100 mg


Glyburide (Diabeta -)  5 mg PO BIDAC [facility]


   Last Admin: 11/05/18 17:06 Dose:  5 mg


HCTZ/Losartan Potassium (Hyzaar -)  2 tab PO DAILY [facility]


   Last Admin: 11/06/18 11:22 Dose:  2 tab


Ceftriaxone Sodium 2 gm/ (Dextrose)  100 mls @ 100 mls/hr IVPB DAILY [facility]; 

Protocol


   Last Admin: 11/06/18 11:24 Dose:  100 mls/hr


Insulin Aspart (Novolog Vial Sliding Scale -)  1 vial SQ ACHS [facility]; Protocol


   Last Admin: 11/06/18 11:39 Dose:  2 unit


Insulin Detemir (Levemir Vial)  15 units SQ HS [facility]


Insulin Detemir (Levemir Vial)  20 units SQ AM [facility]


Metoprolol Tartrate (Lopressor -)  25 mg PO BID [facility]


   Last Admin: 11/06/18 11:23 Dose:  25 mg


Montelukast Sodium (Singulair -)  10 mg PO HS [facility]


   Last Admin: 11/05/18 21:19 Dose:  10 mg


Multivitamins (Total B With C -)  1 each PO DAILY [facility]


   Last Admin: 11/06/18 11:23 Dose:  1 each


Ondansetron HCl (Zofran Injection)  4 mg IVPUSH Q6H PRN


   PRN Reason: NAUSEA


Silver Sulfadiazine (Silvadene -)  1 applic TP DAILY [facility]


   Last Admin: 11/05/18 10:50 Dose:  Not Given


Tramadol HCl (Ultram -)  50 mg PO Q6H PRN


   PRN Reason: PAIN 4-6


   Last Admin: 11/06/18 11:33 Dose:  50 mg











- Objective


Vital Signs: 


 Vital Signs











Temperature  98 F   11/06/18 10:00


 


Pulse Rate  84   11/06/18 10:00


 


Respiratory Rate  18   11/06/18 10:00


 


Blood Pressure  150/65   11/06/18 10:00


 


O2 Sat by Pulse Oximetry (%)  95   11/05/18 21:00











Constitutional: Yes: Well Nourished, No Distress, Calm, Obese


Eyes: No: Sclera Icterus


HENT: No: Nasal Congestion


Cardiovascular: Yes: Regular Rate and Rhythm, S1, S2.  No: JVD, Gallop, Murmur


Respiratory: Yes: Regular, CTA Bilaterally.  No: Accessory Muscle Use, Rales, 

Wheezes


Gastrointestinal: Yes: Normal Bowel Sounds, Soft.  No: Tenderness


Musculoskeletal: Yes: Other (No kyphosis)


Extremities: No: Cold


Edema: Yes


Integumentary: No: Jaundice


Neurological: Yes: Alert, Oriented


Psychiatric: No: Agitated


Labs: 


 CBC, BMP





 11/06/18 05:30 





 11/06/18 05:30 





 INR, PTT











INR  1.14  (0.83-1.09)  H  10/31/18  14:24    














Assessment/Plan





cxr: clear lungs





ecg: sr, nl intervals, nonspecific STs (upsloping STDs), no old





echo 10/2018:  mild lvh, nl lv/rv, no sig valve path





tele: NSR








a/p:  68 f hx morbid obesity, venous insuff/edema, chronic leg wounds, dm, htn, 

anemia, here with fever.





NSTEMI:


-rise-and-fall pattern to trop (peaked at 1.6), with nonspecific ST 

abnormalities on admission ECG 10/31 (no old to compare)


-also with reported c/o chest pressure at that time


-suspect true plaque rupture ACS > sepsis myonecrosis.


-normal LV fxn on echo


-remains asymptomatic.


-treated with ASA, UFH gtt (now off), statin hi intensity (atorva incr'd to 80)


-plavix deferred due to acute anemia requiring PRBCs here


-rec ischemia eval (pharm MPI vs cath, both reasonable--pt pref) after acute 

infectious issue resolves. 


-d/w'd pt risk of missing balanced ischemia (in diabetic) with MPI, and risks 

of complications of cath but better for definitive risk assessment of 

underlying anatomy. i rec'd she have cath given insulin-requiring DM raises 

risk of underlying MVDz but she wishes to consider it further and d/w dtr.


-would need to reconsider cath/PCI and prolonged DAPT if evidence of recurrent 

anemia or occult blood in stool (ordered to be sent)


-observe for recurrent sx's of ischemia





sepsis, cellulitis:


-abx per PMD, ID


- vascular consulted, Dr. Saini





venous insuff/le edema:


-on lasix at home, held here due to santi likely from sepsis, Cr stable now. cont 

holding





HTN:


-suboptimal BP control here


-rec tighter bp targets given possibly s/p recent MI 


-change metoprolol to carvedilol--titrate dose as needed


-cont clonidine, amlodipine home regimen


-low threshold to add ACE or ARB next





DM, on insulin:


-per hospitalist





anemia:


-pt has baseline anemia but hgb dropped into 7's 11/1, no obvious bleeding, may 

just be dilutional from ivfs she got.  got prbcs, hgb stable

## 2018-11-07 VITALS — DIASTOLIC BLOOD PRESSURE: 72 MMHG | HEART RATE: 76 BPM | TEMPERATURE: 99 F | SYSTOLIC BLOOD PRESSURE: 148 MMHG

## 2018-11-07 LAB
ACANTHOCYTES BLD QL SMEAR: 0
ANION GAP SERPL CALC-SCNC: 8 MMOL/L (ref 8–16)
ANISOCYTOSIS BLD QL: 0
BASO STIPL BLD QL SMEAR: 0
BASOPHILS # BLD: 0.8 % (ref 0–2)
BUN SERPL-MCNC: 32 MG/DL (ref 7–18)
CALCIUM SERPL-MCNC: 8.1 MG/DL (ref 8.5–10.1)
CHLORIDE SERPL-SCNC: 103 MMOL/L (ref 98–107)
CO2 SERPL-SCNC: 24 MMOL/L (ref 21–32)
CREAT SERPL-MCNC: 1.2 MG/DL (ref 0.55–1.3)
DACRYOCYTES BLD QL SMEAR: 0
DEPRECATED RDW RBC AUTO: 15.9 % (ref 11.6–15.6)
DOHLE BOD BLD QL SMEAR: 0
EOSINOPHIL # BLD: 1.7 % (ref 0–4.5)
GLUCOSE SERPL-MCNC: 182 MG/DL (ref 74–106)
HCT VFR BLD CALC: 25 % (ref 32.4–45.2)
HELMET CELLS BLD QL SMEAR: 0
HGB BLD-MCNC: 8.2 GM/DL (ref 10.7–15.3)
HOWELL-JOLLY BOD BLD QL SMEAR: 0
LYMPHOCYTES # BLD: 10.3 % (ref 8–40)
MACROCYTES BLD QL: 0
MAGNESIUM SERPL-MCNC: 2.3 MG/DL (ref 1.8–2.4)
MCH RBC QN AUTO: 26.5 PG (ref 25.7–33.7)
MCHC RBC AUTO-ENTMCNC: 32.7 G/DL (ref 32–36)
MCV RBC: 81 FL (ref 80–96)
MONOCYTES # BLD AUTO: 7.8 % (ref 3.8–10.2)
NEUTROPHILS # BLD: 79.4 % (ref 42.8–82.8)
OVALOCYTES BLD QL SMEAR: 0
PHOSPHATE SERPL-MCNC: 4.3 MG/DL (ref 2.5–4.9)
PLATELET # BLD AUTO: 395 K/MM3 (ref 134–434)
PLATELET BLD QL SMEAR: NORMAL
PMV BLD: 7.4 FL (ref 7.5–11.1)
POTASSIUM SERPLBLD-SCNC: 4.6 MMOL/L (ref 3.5–5.1)
RBC # BLD AUTO: 3.08 M/MM3 (ref 3.6–5.2)
ROULEAUX BLD QL SMEAR: 0
SICKLE CELLS BLD QL SMEAR: 0
SODIUM SERPL-SCNC: 136 MMOL/L (ref 136–145)
TARGETS BLD QL SMEAR: 0
TOXIC GRANULES BLD QL SMEAR: 0
WBC # BLD AUTO: 11.4 K/MM3 (ref 4–10)

## 2018-11-07 PROCEDURE — B518ZZA FLUOROSCOPY OF SUPERIOR VENA CAVA, GUIDANCE: ICD-10-PCS | Performed by: RADIOLOGY

## 2018-11-07 PROCEDURE — 02HV33Z INSERTION OF INFUSION DEVICE INTO SUPERIOR VENA CAVA, PERCUTANEOUS APPROACH: ICD-10-PCS | Performed by: RADIOLOGY

## 2018-11-07 RX ADMIN — CEFTRIAXONE SODIUM SCH MLS/HR: 2 INJECTION, POWDER, FOR SOLUTION INTRAMUSCULAR; INTRAVENOUS at 11:36

## 2018-11-07 RX ADMIN — INSULIN ASPART SCH UNIT: 100 INJECTION, SOLUTION INTRAVENOUS; SUBCUTANEOUS at 12:26

## 2018-11-07 RX ADMIN — ASPIRIN 81 MG SCH MG: 81 TABLET ORAL at 11:06

## 2018-11-07 RX ADMIN — INSULIN ASPART SCH UNIT: 100 INJECTION, SOLUTION INTRAVENOUS; SUBCUTANEOUS at 06:17

## 2018-11-07 RX ADMIN — GLYBURIDE SCH MG: 5 TABLET ORAL at 06:16

## 2018-11-07 RX ADMIN — LOSARTAN POTASSIUM AND HYDROCHLOROTHIAZIDE TABLETS SCH TAB: 50; 12.5 TABLET, FILM COATED ORAL at 11:40

## 2018-11-07 RX ADMIN — VITAMIN C SCH EACH: TAB at 11:44

## 2018-11-07 RX ADMIN — INSULIN ASPART SCH UNIT: 100 INJECTION, SOLUTION INTRAVENOUS; SUBCUTANEOUS at 17:18

## 2018-11-07 RX ADMIN — CARVEDILOL SCH MG: 12.5 TABLET, FILM COATED ORAL at 11:14

## 2018-11-07 RX ADMIN — VITAMIN D, TAB 1000IU (100/BT) SCH UNIT: 25 TAB at 11:06

## 2018-11-07 RX ADMIN — INSULIN ASPART SCH UNIT: 100 INJECTION, SOLUTION INTRAVENOUS; SUBCUTANEOUS at 12:24

## 2018-11-07 RX ADMIN — GABAPENTIN SCH MG: 100 CAPSULE ORAL at 13:50

## 2018-11-07 RX ADMIN — Medication SCH MG: at 11:43

## 2018-11-07 RX ADMIN — BUDESONIDE AND FORMOTEROL FUMARATE DIHYDRATE SCH PUFF: 160; 4.5 AEROSOL RESPIRATORY (INHALATION) at 11:46

## 2018-11-07 RX ADMIN — GLYBURIDE SCH MG: 5 TABLET ORAL at 17:18

## 2018-11-07 RX ADMIN — GABAPENTIN SCH MG: 100 CAPSULE ORAL at 06:16

## 2018-11-07 RX ADMIN — AMLODIPINE BESYLATE SCH MG: 5 TABLET ORAL at 11:14

## 2018-11-07 NOTE — PN
Progress Note (short form)





- Note


Progress Note: 





Chief Complaint: 


leg wound


History of Present Illness: 





no cp, palps, dizzy, lightheadedness, dyspnea





- Current Medication List


 Current Medications





Acetaminophen (Tylenol -)  650 mg PO Q6H PRN


   PRN Reason: FEVER


   Last Admin: 11/04/18 21:55 Dose:  650 mg


Amlodipine Besylate (Norvasc -)  5 mg PO BID [facility]


   Last Admin: 11/07/18 11:14 Dose:  5 mg


Ascorbic Acid (Vitamin C -)  250 mg PO DAILY [facility]


   Last Admin: 11/07/18 11:43 Dose:  250 mg


Aspirin (Asa -)  81 mg PO DAILY [facility]


   Last Admin: 11/07/18 11:06 Dose:  81 mg


Atorvastatin Calcium (Lipitor -)  80 mg PO HS [facility]


   Last Admin: 11/06/18 22:20 Dose:  80 mg


Budesonide/Formoterol Fumarate (Symbicort 160/4.5mcg -)  2 puff IH BID [facility]


   Last Admin: 11/07/18 11:46 Dose:  2 puff


Carvedilol (Coreg -)  12.5 mg PO BID [facility]


   Last Admin: 11/07/18 11:14 Dose:  12.5 mg


Cholecalciferol (Vitamin D3 -)  1,000 unit PO DAILY [facility]


   Last Admin: 11/07/18 11:06 Dose:  1,000 unit


Clonidine (Catapres -)  0.2 mg PO BID [facility]


   Last Admin: 11/07/18 11:06 Dose:  0.2 mg


Collagenase (Santyl -)  1 applic TP DAILY [facility]; Protocol


   Last Admin: 11/05/18 10:50 Dose:  Not Given


Cyclobenzaprine HCl (Flexeril -)  10 mg PO HS [facility]


   Last Admin: 11/06/18 22:20 Dose:  10 mg


Gabapentin (Neurontin -)  100 mg PO TID [facility]


   Last Admin: 11/07/18 06:16 Dose:  100 mg


Glyburide (Diabeta -)  5 mg PO BIDAC [facility]


   Last Admin: 11/07/18 06:16 Dose:  5 mg


HCTZ/Losartan Potassium (Hyzaar -)  2 tab PO DAILY [facility]


   Last Admin: 11/07/18 11:40 Dose:  2 tab


IV Flush (Picc Line Flush)  8 ml IVPUSH PRN PRN


   PRN Reason: Protocol


Ceftriaxone Sodium 2 gm/ (Dextrose)  100 mls @ 100 mls/hr IVPB DAILY [facility]; 

Protocol


   Last Admin: 11/07/18 11:36 Dose:  100 mls/hr


Insulin Aspart (Novolog Vial Sliding Scale -)  1 vial SQ ACHS [facility]; Protocol


   Last Admin: 11/07/18 06:17 Dose:  2 unit


Insulin Detemir (Levemir Vial)  20 units SQ AM [facility]


   Last Admin: 11/07/18 06:18 Dose:  20 units


Insulin Detemir (Levemir Vial)  18 units SQ HS [facility]


Montelukast Sodium (Singulair -)  10 mg PO HS [facility]


   Last Admin: 11/06/18 22:20 Dose:  10 mg


Multivitamins (Total B With C -)  1 each PO DAILY [facility]


   Last Admin: 11/07/18 11:44 Dose:  1 each


Ondansetron HCl (Zofran Injection)  4 mg IVPUSH Q6H PRN


   PRN Reason: NAUSEA


Silver Sulfadiazine (Silvadene -)  1 applic TP DAILY [facility]


   Last Admin: 11/05/18 10:50 Dose:  Not Given


Tramadol HCl (Ultram -)  50 mg PO Q6H PRN


   PRN Reason: PAIN 4-6


   Last Admin: 11/07/18 11:42 Dose:  50 mg














- Objective


Vital Signs: 


  Vital Signs











 Period  Temp  Pulse  Resp  BP Sys/Guevara  Pulse Ox


 


 Last 24 Hr  97.5 F-98.8 F  65-86  18-22  142-156/47-69  96-97














Constitutional: Yes: Well Nourished, No Distress, Calm, Obese


Eyes: No: Sclera Icterus


HENT: No: Nasal Congestion


Cardiovascular: Yes: Regular Rate and Rhythm, S1, S2.  No: JVD, Gallop, Murmur


Respiratory: Yes: Regular, CTA Bilaterally.  No: Accessory Muscle Use, Rales, 

Wheezes


Gastrointestinal: Yes: Normal Bowel Sounds, Soft.  No: Tenderness


Musculoskeletal: Yes: Other (No kyphosis)


Extremities: No: Cold


Edema: Yes


Integumentary: No: Jaundice


Neurological: Yes: Alert, Oriented


Psychiatric: No: Agitated








Assessment/Plan





cxr: clear lungs





ecg: sr, nl intervals, nonspecific STs (upsloping STDs), no old





echo 10/2018:  mild lvh, nl lv/rv, no sig valve path





tele: NSR








a/p:  68 f hx morbid obesity, venous insuff/edema, chronic leg wounds, dm, htn, 

anemia, here with fever.





NSTEMI:


-rise-and-fall pattern to trop (peaked at 1.6), with nonspecific ST 

abnormalities on admission ECG 10/31 (no old to compare)


-also with reported c/o chest pressure at that time


-suspect true plaque rupture ACS > sepsis myonecrosis.


-normal LV fxn on echo


-remains asymptomatic.


-treated with ASA, UFH gtt (now off), statin hi intensity (atorva incr'd to 80)


-plavix deferred due to acute anemia requiring PRBCs here


-d/w'd pt risk of missing balanced ischemia (in diabetic) with MPI, and risks 

of complications of cath but better for definitive risk assessment of 

underlying anatomy. i rec'd she have cath given insulin-requiring DM raises 

risk of underlying MVDz but she wishes to consider it further and d/w dtr.


-would need to reconsider cath/PCI and prolonged DAPT if evidence of recurrent 

anemia or occult blood in stool (ordered to be sent)


-observe for recurrent sx's of ischemia


-rec ischemia eval (pharm MPI vs cath, both reasonable--pt pref) after acute 

infectious issue resolves - will be getting PICC line and course of IV abx, 

plan to do cardiac cath after abx completed, H/H stable. discussed with patient

, she is agreeable.








sepsis, cellulitis:


-abx per PMD, ID


- vascular consulted, Dr. Saini





venous insuff/le edema:


-on lasix at home, held here due to santi likely from sepsis, Cr stable now. cont 

holding





HTN:


-suboptimal BP control here


-rec tighter bp targets given possibly s/p recent MI 


-change metoprolol to carvedilol--titrate dose as needed


-cont clonidine, amlodipine home regimen


-low threshold to add ACE or ARB next





DM, on insulin:


-per hospitalist





anemia:


-pt has baseline anemia but hgb dropped into 7's 11/1, no obvious bleeding, may 

just be dilutional from ivfs she got.  got prbcs, hgb stable

## 2018-11-07 NOTE — DS
Physical Examination


Vital Signs: 


 Vital Signs











Temperature  98.5 F   11/07/18 08:57


 


Pulse Rate  73   11/07/18 08:57


 


Respiratory Rate  18   11/07/18 08:57


 


Blood Pressure  147/69   11/07/18 08:57


 


O2 Sat by Pulse Oximetry (%)  96   11/07/18 08:57











Findings/Remarks: 





  


Elderly F  not in distress denies any complaints





HEENT: Mm moist, anemia, PERRLA, EOMI





NECK: No JVD No Bruit





CHEST: Minimal Basal crepts





CVS: S1S2 R no m/g/r





ABD: Obese, no distention BS +





EXT: B/L Edema feet,  , venous stasis ulcer in dressing





CNS: AO X3 grossly non focal








Labs: 


 CBC, BMP





 11/07/18 05:30 





 11/07/18 05:30 











Discharge Summary


Reason For Visit: CELLULITIS


Current Active Problems





Cellulitis (Acute)


Sepsis (Acute)


Anemia (Acute)


UTI (acute)


MARIA ANTONIA (acute)


CKD stage 3


NSTEMI (non-ST elevated myocardial infarction) (Acute)


COPD (chronic obstructive pulmonary disease) (Acute)


NSTEMI (non-ST elevated myocardial infarction) (Acute)


Chronic pain (Acute)


T2 Diabetes Mellitus


HTN (hypertension) 


Morbid obesity (BMI > 50)


Venous stasis ulcers of both lower extremities 








Other Procedures: Blodd transfusion


Hospital Course: 





68 yrs old F with multiple Medical Co-morbidities H/O T2DM< HTN, CKD stage 3, 

Morbidly obese, Hypercholesterolemia,  chronic Venous stasis ulcers B/L LE, F/U 

at wound care Ctr present with worsening LE wound with fever elevated TWBC, 

worsening renal functions, UTI, during the course of Hospitalization  Troponin 

I was elevated peaked > 6  no chest pain or acute ST T changes treated with 

heparin infusion B Blockers  Coreg 12.5 mg BID and Lipitor 80 mg, patient 

sepsis gradually improved initially put on Vancomycin and Zosyn Blood culture 

Grew  Group B strepto Cocci and Urine Grew  Enterococci Colace switched to 

Ceftriaxone , ID recommenced total 2 wks of abx  will complete on 11/14/2018 (

Ceftriaxone 1 gm Daily) , Cardiology recommended out patient Cath/Stress test 

once completes sepsis treatment.


Condition: Stable





- Instructions


Referrals: 


Sage Cruz MD [Staff Physician] - 1 Month


Khoi Aleman MD, MD [Primary Care Provider] - 1 Month


Rebecca Rodriguez MD [Staff Physician] - 1 Month


Disposition: SKILLED NURSING FACILITY





- Home Medications


Comprehensive Discharge Medication List: 


Ambulatory Orders





Aspirin [Baby Aspirin] 81 mg PO DAILY 05/17/12 


Montelukast Na [Singulair -] 10 mg PO HS 05/17/12 


Gabapentin 1 cap PO TID 02/11/14 


Amlodipine Besylate [Norvasc -] 5 mg PO BID 03/31/14 


Ascorbic Acid [Vitamin C] 250 mg PO DAILY 03/31/14 


Cholecalciferol (Vitamin D3) [Vitamin D] 1,000 unit PO DAILY 03/31/14 


Docosahexanoic Acid/Epa [Fish Oil Softgel] 1 each PO DAILY 03/31/14 


Glyburide 5 mg PO BID 03/31/14 


Tramadol HCl 50 mg PO QID PRN 03/31/14 


Vitamin B Complex 1 each PO DAILY 03/31/14 


oxyCODONE HCL [Roxicodone -] 5 mg PO Q6H PRN 03/31/14 


Cyclobenzaprine HCl 10 mg PO HS 05/12/15 


Acetaminophen [Tylenol .Regular Strength -] 650 mg PO Q6H PRN  tablet 11/07/18 


Atorvastatin Ca [Lipitor] 80 mg PO HS  tablet 11/07/18 


Budesonide/Formeterol Fumarate [SYMBICORT 160/4.5mcg -] 2 puff IH BID  inhaler 

11/07/18 


Carvedilol [Coreg -] 12.5 mg PO BID  tablet 11/07/18 


Ceftriaxone [Rocephin -] 2 gm IVPB DAILY  vial 11/07/18 


Collagenase Clostridium Hist. [Santyl -] 1 applic TP DAILY  tube 11/07/18 


Insulin (Levemir) [Levemir Vial] 18 units SQ HS  units 11/07/18 


Insulin (Levemir) [Levemir Vial] 20 units SQ AM  units 11/07/18 


Insulin Sliding Scale [Novolog Vial Sliding Scale -] 1 vial SQ ACHS  units 11/07 /18 


Losartan 50Mg/Hctz 12.5MG [Hyzaar -] 2 tab PO DAILY  tablet 11/07/18 


Picc Line Flush [Picc Line Flush -] 8 ml IVPUSH PRN PRN  ml 11/07/18 


Silver Sulfadiazine 1% Top Cr [Silvadene -] 1 applic TP DAILY  jar 11/07/18 


cloNIDine HCL [Catapres -] 0.2 mg PO BID  tablet 11/07/18

## 2018-11-07 NOTE — PN
Progress Note, Physician


Chief Complaint: 


 Comfortably sleeping , no c/o chest pain  still C/O Left leg pain  





- Current Medication List


Current Medications: 


Active Medications





Acetaminophen (Tylenol -)  650 mg PO Q6H PRN


   PRN Reason: FEVER


   Last Admin: 11/04/18 21:55 Dose:  650 mg


Amlodipine Besylate (Norvasc -)  5 mg PO BID [facility]


   Last Admin: 11/06/18 22:20 Dose:  5 mg


Ascorbic Acid (Vitamin C -)  250 mg PO DAILY [facility]


   Last Admin: 11/06/18 11:23 Dose:  250 mg


Aspirin (Asa -)  81 mg PO DAILY [facility]


   Last Admin: 11/06/18 11:23 Dose:  81 mg


Atorvastatin Calcium (Lipitor -)  80 mg PO HS [facility]


   Last Admin: 11/06/18 22:20 Dose:  80 mg


Budesonide/Formoterol Fumarate (Symbicort 160/4.5mcg -)  2 puff IH BID [facility]


   Last Admin: 11/06/18 22:24 Dose:  2 puff


Carvedilol (Coreg -)  12.5 mg PO BID [facility]


   Last Admin: 11/06/18 22:22 Dose:  12.5 mg


Cholecalciferol (Vitamin D3 -)  1,000 unit PO DAILY [facility]


   Last Admin: 11/06/18 11:23 Dose:  1,000 unit


Clonidine (Catapres -)  0.2 mg PO BID [facility]


   Last Admin: 11/06/18 22:21 Dose:  0.2 mg


Collagenase (Santyl -)  1 applic TP DAILY [facility]; Protocol


   Last Admin: 11/05/18 10:50 Dose:  Not Given


Cyclobenzaprine HCl (Flexeril -)  10 mg PO HS [facility]


   Last Admin: 11/06/18 22:20 Dose:  10 mg


Gabapentin (Neurontin -)  100 mg PO TID [facility]


   Last Admin: 11/07/18 06:16 Dose:  100 mg


Glyburide (Diabeta -)  5 mg PO BIDAC [facility]


   Last Admin: 11/07/18 06:16 Dose:  5 mg


HCTZ/Losartan Potassium (Hyzaar -)  2 tab PO DAILY [facility]


   Last Admin: 11/06/18 11:22 Dose:  2 tab


Ceftriaxone Sodium 2 gm/ (Dextrose)  100 mls @ 100 mls/hr IVPB DAILY [facility]; 

Protocol


   Last Admin: 11/06/18 11:24 Dose:  100 mls/hr


Insulin Aspart (Novolog Vial Sliding Scale -)  1 vial SQ ACHS [facility]; Protocol


   Last Admin: 11/07/18 06:17 Dose:  2 unit


Insulin Detemir (Levemir Vial)  15 units SQ HS [facility]


   Last Admin: 11/06/18 22:23 Dose:  15 units


Insulin Detemir (Levemir Vial)  20 units SQ AM [facility]


   Last Admin: 11/07/18 06:18 Dose:  20 units


Montelukast Sodium (Singulair -)  10 mg PO HS [facility]


   Last Admin: 11/06/18 22:20 Dose:  10 mg


Multivitamins (Total B With C -)  1 each PO DAILY [facility]


   Last Admin: 11/06/18 11:23 Dose:  1 each


Ondansetron HCl (Zofran Injection)  4 mg IVPUSH Q6H PRN


   PRN Reason: NAUSEA


Silver Sulfadiazine (Silvadene -)  1 applic TP DAILY [facility]


   Last Admin: 11/05/18 10:50 Dose:  Not Given


Tramadol HCl (Ultram -)  50 mg PO Q6H PRN


   PRN Reason: PAIN 4-6


   Last Admin: 11/06/18 11:33 Dose:  50 mg











- Objective


Vital Signs: 


 Vital Signs











Temperature  98.4 F   11/07/18 02:00


 


Pulse Rate  65   11/07/18 06:00


 


Respiratory Rate  18   11/07/18 06:00


 


Blood Pressure  142/61   11/07/18 06:00


 


O2 Sat by Pulse Oximetry (%)  97   11/06/18 21:00








Elderly F not in distress  c/o body pain





HEENT:  MM moist , mild  anemia, PERRLA EOMI





NECK: No JVd No Bruit





CHEST: Minimal basal crepts 





CVS: S1S2 R no m/g/r





ABD:  Obese non tender BS +, Last BM yesterday





EXT  & BACK: Extensive edema, foul smelling ulcers  Left > Rt Venous stasis 

changes





CNS: AOX3 Non focal 





DERM: B/L infected wounds 


Labs: 


 CBC, BMP





 11/07/18 05:30 





 11/07/18 05:30 





 INR, PTT











INR  1.14  (0.83-1.09)  H  10/31/18  14:24    














Problem List





- Problems


(1) Cellulitis


Assessment/Plan: 


 Admitted with Extensive B/L LE cellulitis present with elevated TWBC  Lactic 

acid and fever suggestive of Sepsis grew poly now on Ceftriaxone for Strept 

Group B in Blood and Enterobacter Colace  in urine rpt cultures are -ve , ID 

recommended total 2 wks of abx


Code(s): L03.90 - CELLULITIS, UNSPECIFIED   


Qualifiers: 


   Site of cellulitis: extremity   Site of cellulitis of extremity: lower 

extremity   Laterality: left   Qualified Code(s): L03.116 - Cellulitis of left 

lower limb   





(2) Sepsis


Assessment/Plan: 


Blood Grew Strept Group B and U grew Enterobacter Colace on Ceftriaxone  need 

total 2 wks abx  afebrile TWBC is 11.4


Code(s): A41.9 - SEPSIS, UNSPECIFIED ORGANISM   





(3) NSTEMI (non-ST elevated myocardial infarction)


Assessment/Plan: 


Elevated Troponin I multiple CAD risk factors most likely Demand Ischemia /ACS  

on B Blockers statin , ASA , plavix deverred for anemia off Heparin normal ECHO

, remained CP free, not sure about previous stress test, evaluated by 

Cardiology consult  recommended Stress test/Cath once   Sepsis is treated and 

patient may be as outpatient will discuss with Cardiology consult.


Code(s): I21.4 - NON-ST ELEVATION (NSTEMI) MYOCARDIAL INFARCTION   





(4) Diabetes


Assessment/Plan: 


T2DM with neuropathy and nephropathy on Levimir 20 am  an  will increase 

PM Levimir to 18 units   


Code(s): E11.9 - TYPE 2 DIABETES MELLITUS WITHOUT COMPLICATIONS   


Qualifiers: 


   Diabetes mellitus type: type 2   Diabetes mellitus long term insulin use: 

with long term use   Diabetes mellitus complication status: with skin 

complications   Diabetes mellitus complication detail: with other skin ulcer   

Qualified Code(s): E11.622 - Type 2 diabetes mellitus with other skin ulcer; 

Z79.4 - Long term (current) use of insulin   





(5) Anemia


Assessment/Plan: 


Chronic H/H is table


Code(s): D64.9 - ANEMIA, UNSPECIFIED   





(6) HTN (hypertension)


Assessment/Plan: 


 Now Well controlled cont all home meds


Code(s): I10 - ESSENTIAL (PRIMARY) HYPERTENSION   





(7) Morbid obesity


Assessment/Plan: 


Morbidly obese BMI 53 will F/U nutritional  assessment as out patient on DC


Code(s): E66.01 - MORBID (SEVERE) OBESITY DUE TO EXCESS CALORIES   





(8) Chronic pain


Assessment/Plan: 


cont current management


Code(s): G89.29 - OTHER CHRONIC PAIN   


Qualifiers: 


   Chronic pain type: chronic pain syndrome   Qualified Code(s): G89.4 - 

Chronic pain syndrome   





(9) COPD (chronic obstructive pulmonary disease)


Assessment/Plan: 


On symbicort cont same at present stable


Code(s): J44.9 - CHRONIC OBSTRUCTIVE PULMONARY DISEASE, UNSPECIFIED   





(10) Chronic pain


Assessment/Plan: 


Gabapentin and Tramadol


Code(s): G89.29 - OTHER CHRONIC PAIN   





(11) Venous stasis ulcers of both lower extremities


Assessment/Plan: 


cont wound care 


Code(s): I83.019 - VARICOSE VEINS OF RIGHT LOWER EXTREMITY W ULCER OF UNSP SITE

; I83.029 - VARICOSE VEINS OF LEFT LOWER EXTREMITY W ULCER OF UNSP SITE; 

L97.919 - NON-PRS CHRONIC ULC UNSP PRT OF R LOW LEG W UNSP SEVERITY; L97.929 - 

NON-PRS CHRONIC ULC UNSP PRT OF L LOW LEG W UNSP SEVERITY